# Patient Record
Sex: FEMALE | Race: WHITE | NOT HISPANIC OR LATINO | Employment: FULL TIME | URBAN - METROPOLITAN AREA
[De-identification: names, ages, dates, MRNs, and addresses within clinical notes are randomized per-mention and may not be internally consistent; named-entity substitution may affect disease eponyms.]

---

## 2019-07-24 ENCOUNTER — APPOINTMENT (OUTPATIENT)
Dept: RADIOLOGY | Facility: CLINIC | Age: 34
End: 2019-07-24
Payer: COMMERCIAL

## 2019-07-24 ENCOUNTER — OFFICE VISIT (OUTPATIENT)
Dept: URGENT CARE | Facility: CLINIC | Age: 34
End: 2019-07-24
Payer: COMMERCIAL

## 2019-07-24 ENCOUNTER — TELEPHONE (OUTPATIENT)
Dept: URGENT CARE | Facility: CLINIC | Age: 34
End: 2019-07-24

## 2019-07-24 VITALS
HEIGHT: 71 IN | BODY MASS INDEX: 41.02 KG/M2 | HEART RATE: 74 BPM | WEIGHT: 293 LBS | DIASTOLIC BLOOD PRESSURE: 78 MMHG | OXYGEN SATURATION: 97 % | SYSTOLIC BLOOD PRESSURE: 126 MMHG | TEMPERATURE: 97.6 F

## 2019-07-24 DIAGNOSIS — S92.101A CLOSED DISPLACED FRACTURE OF RIGHT TALUS, UNSPECIFIED FRACTURE MORPHOLOGY, INITIAL ENCOUNTER: ICD-10-CM

## 2019-07-24 DIAGNOSIS — S93.401A SPRAIN OF RIGHT ANKLE, UNSPECIFIED LIGAMENT, INITIAL ENCOUNTER: ICD-10-CM

## 2019-07-24 DIAGNOSIS — S93.401A SPRAIN OF RIGHT ANKLE, UNSPECIFIED LIGAMENT, INITIAL ENCOUNTER: Primary | ICD-10-CM

## 2019-07-24 PROCEDURE — 73610 X-RAY EXAM OF ANKLE: CPT

## 2019-07-24 PROCEDURE — 99213 OFFICE O/P EST LOW 20 MIN: CPT | Performed by: FAMILY MEDICINE

## 2019-07-24 RX ORDER — NAPROXEN 500 MG/1
500 TABLET ORAL 2 TIMES DAILY WITH MEALS
Qty: 20 TABLET | Refills: 0 | Status: SHIPPED | OUTPATIENT
Start: 2019-07-24 | End: 2020-07-07

## 2019-07-24 NOTE — PROGRESS NOTES
[id number] [email or web address]        NAME: Solitario Gan is a 36 y o  female  : 1985    MRN: [id number]  DATE: 2019  TIME: 10:27 AM    Assessment and Plan   Sprain of right ankle, unspecified ligament, initial encounter [S93 401A]  1  Sprain of right ankle, unspecified ligament, initial encounter  XR ankle 3+ vw right   2  Closed displaced fracture of right talus, unspecified fracture morphology, initial encounter  Ambulatory referral to Orthopedic Surgery     X-ray appears to show a closed displaced fracture of talar neck with mild tib-fib disassociation; official read pending  Patient placed in a splint, given crutches and referred to Orthopedic surgery for further evaluation and management  Patient Instructions     Follow up with PCP in 3-5 days  Proceed to  ER if symptoms worsen  Chief Complaint     Chief Complaint   Patient presents with    Ankle Injury     R ankle pain, swelling  she twisted it yesterday morning  been elevating and icing  Tylenol is not helping  pt is limping  History of Present Illness       28-year-old female presents today due to right ankle pain secondary to mechanical fall which occurred yesterday morning  Was walking on her deck when she slipped on some leaves falling forward  Her right foot twisted inward as she fell  Was initially able to walk on it, but her pain progressively worsened  Reports pain in the ankle and the distal lower leg  Denies any head trauma, loss of consciousness or dizziness  Review of Systems   Review of Systems   Constitutional: Negative for chills and fever  Musculoskeletal: Positive for arthralgias, gait problem and joint swelling  Skin: Positive for color change  Neurological: Negative for dizziness  Current Medications     No current outpatient medications on file      Current Allergies     Allergies as of 2019    (No Known Allergies)            The following portions of the patient's history were reviewed and updated as appropriate: allergies, current medications, past family history, past medical history, past social history, past surgical history and problem list      Past Medical History:   Diagnosis Date    Hypertension     after her pregnancy  no longer on BP meds  (19)       Past Surgical History:   Procedure Laterality Date     SECTION      x 2    ECTOPIC PREGNANCY SURGERY         Family History   Problem Relation Age of Onset    No Known Problems Mother     No Known Problems Father          Medications have been verified  Objective   /78 Comment: R arm, manual, burgundy cuff  Pulse 74   Temp 97 6 °F (36 4 °C)   Ht 5' 11" (1 803 m)   Wt (!) 142 kg (312 lb)   SpO2 97%   Breastfeeding? No   BMI 43 52 kg/m²        Physical Exam     Physical Exam   Constitutional: She is oriented to person, place, and time  She appears well-developed and well-nourished  She appears distressed (Antalgic gait)  HENT:   Head: Normocephalic and atraumatic  Eyes: Conjunctivae are normal    Pulmonary/Chest: Effort normal    Musculoskeletal: She exhibits edema and tenderness  She exhibits no deformity  Mildly decreased active ROM of the right ankle  Swelling and faint ecchymosis of lateral malleolus  Point tenderness over the anterior portion of the distal fibula near the ankle  Neurological: She is alert and oriented to person, place, and time  No sensory deficit  Skin: Skin is warm  She is not diaphoretic  No erythema  Psychiatric: She has a normal mood and affect  Her behavior is normal  Judgment and thought content normal    Nursing note and vitals reviewed

## 2019-07-24 NOTE — LETTER
July 24, 2019     Patient: Mak Schmitt   YOB: 1985   Date of Visit: 7/24/2019       To Whom It May Concern:    Mak Schmitt was evaluated here on 07/24/2019  Please excuse her absence  If you have any questions or concerns, please don't hesitate to call           Sincerely,        Abel Posey MD    CC: No Recipients

## 2020-07-07 ENCOUNTER — OFFICE VISIT (OUTPATIENT)
Dept: OBGYN CLINIC | Facility: CLINIC | Age: 35
End: 2020-07-07
Payer: COMMERCIAL

## 2020-07-07 VITALS
WEIGHT: 259.8 LBS | SYSTOLIC BLOOD PRESSURE: 118 MMHG | HEIGHT: 70 IN | DIASTOLIC BLOOD PRESSURE: 80 MMHG | BODY MASS INDEX: 37.19 KG/M2

## 2020-07-07 DIAGNOSIS — R35.0 URINARY FREQUENCY: ICD-10-CM

## 2020-07-07 DIAGNOSIS — Z11.3 SCREENING EXAMINATION FOR STD (SEXUALLY TRANSMITTED DISEASE): Primary | ICD-10-CM

## 2020-07-07 PROCEDURE — S0610 ANNUAL GYNECOLOGICAL EXAMINA: HCPCS | Performed by: STUDENT IN AN ORGANIZED HEALTH CARE EDUCATION/TRAINING PROGRAM

## 2020-07-07 RX ORDER — ALPRAZOLAM 0.25 MG/1
0.25 TABLET ORAL 3 TIMES DAILY PRN
COMMUNITY
Start: 2020-04-15 | End: 2021-10-25

## 2020-07-07 RX ORDER — SULFAMETHOXAZOLE AND TRIMETHOPRIM 800; 160 MG/1; MG/1
TABLET ORAL
COMMUNITY
Start: 2020-07-02 | End: 2020-11-05

## 2020-07-07 NOTE — PROGRESS NOTES
Davonte Garnica  1985    Assessment and Plan:  Yearly exam without abnormality      -Pap up to date: due 3/2022  We reviewed [facility] guidelines for Pap testing today  -Mirena in place: due for replacement Feb 2023  -STI testing today    RTO one year for yearly exam or sooner as needed  CC:  Yearly exam    S:  30 y o  female here for yearly exam      Shola Player (1 ectopic, 1 set of twins)  LMP: unsure, currently spotting  Contraception: Mirena  Last Pap: 3/2017 NILM/HPV neg    Non-smoker, social drinker  Exercises regularly as she is s/p gastric sleeve surgery  Has lost 70#    Doing well overall  Denies hot flushes, dyspareunia, abnormal uterine bleeding, urinary/fecal incontinence, does have some decreases in energy levels, poor mood as a result of COVID pandemic    Her cycles are almost non-existent  Mostly just experiences spotting  Not heavy or crampy  Sexual activity: She is sexually active and has a small amount of discomfort since she was most recently diagnosed with a UTI  Has now taken a full course of antibiotics, without improvement, leading to her being on second course of antibiotics  Generally however, she denies pain, bleeding or dryness  STD testing:  She does want STD testing today       Family hx of breast/ovarian/colon cancer: denies      Current Outpatient Medications:     ALPRAZolam (XANAX) 0 25 mg tablet, Take 0 25 mg by mouth 3 (three) times a day as needed, Disp: , Rfl:     levonorgestrel (MIRENA) 20 MCG/24HR IUD, 1 each by Intrauterine route, Disp: , Rfl:     sulfamethoxazole-trimethoprim (BACTRIM DS) 800-160 mg per tablet, take 1 tablet by mouth twice a day for 7 days, Disp: , Rfl:   Social History     Socioeconomic History    Marital status: /Civil Union     Spouse name: Not on file    Number of children: Not on file    Years of education: Not on file    Highest education level: Not on file   Occupational History    Not on file   Social Needs    Financial resource strain: Not on file    Food insecurity:     Worry: Not on file     Inability: Not on file    Transportation needs:     Medical: Not on file     Non-medical: Not on file   Tobacco Use    Smoking status: Never Smoker    Smokeless tobacco: Never Used   Substance and Sexual Activity    Alcohol use: Yes     Comment: social    Drug use: Never    Sexual activity: Yes     Partners: Male     Birth control/protection: Implant   Lifestyle    Physical activity:     Days per week: Not on file     Minutes per session: Not on file    Stress: Not on file   Relationships    Social connections:     Talks on phone: Not on file     Gets together: Not on file     Attends Synagogue service: Not on file     Active member of club or organization: Not on file     Attends meetings of clubs or organizations: Not on file     Relationship status: Not on file    Intimate partner violence:     Fear of current or ex partner: Not on file     Emotionally abused: Not on file     Physically abused: Not on file     Forced sexual activity: Not on file   Other Topics Concern    Not on file   Social History Narrative    Not on file     Family History   Problem Relation Age of Onset    No Known Problems Mother     No Known Problems Father       Past Medical History:   Diagnosis Date    Acid reflux     Hypertension     after her pregnancy  no longer on BP meds  (7/24/19)    Obesity     Pre-diabetes         Review of Systems   Respiratory: Negative  Cardiovascular: Negative  Gastrointestinal: Negative for constipation and diarrhea  Genitourinary: Negative for difficulty urinating, pelvic pain, vaginal bleeding, vaginal discharge, itching or odor  O:  Blood pressure 118/80, height 5' 9 5" (1 765 m), weight 118 kg (259 lb 12 8 oz), not currently breastfeeding      Patient appears well and is not in distress  Neck is supple without masses  Breasts are symmetrical without mass, tenderness, nipple discharge, skin changes or adenopathy  Fibroadenomatous changes in upper outer quadrant bilaterally, no discrete masses  Abdomen is soft and nontender without masses  Incisions healing well, erythematous rash underneath pannus  External genitals are normal without lesions or rashes  Urethral meatus and urethra are normal  Bladder is normal to palpation  Vagina is normal without discharge or bleeding  Cervix is normal without discharge or lesion  IUD strings visible  Uterus is normal, mobile, nontender without palpable mass  Adnexa are normal, nontender, without palpable mass

## 2020-07-09 LAB
BACTERIA UR CULT: NORMAL
Lab: NO GROWTH

## 2020-07-10 ENCOUNTER — TELEPHONE (OUTPATIENT)
Dept: OBGYN CLINIC | Facility: CLINIC | Age: 35
End: 2020-07-10

## 2020-07-10 NOTE — TELEPHONE ENCOUNTER
----- Message from Iain Addison MD sent at 7/10/2020  8:49 AM EDT -----  Hi, could you please call Fredi Guillen to let her know that her urine culture result was normal, thank you!

## 2020-07-11 LAB
C TRACH RRNA SPEC QL NAA+PROBE: NEGATIVE
N GONORRHOEA RRNA SPEC QL NAA+PROBE: NEGATIVE

## 2020-10-21 ENCOUNTER — TELEPHONE (OUTPATIENT)
Dept: PSYCHIATRY | Facility: CLINIC | Age: 35
End: 2020-10-21

## 2020-10-22 ENCOUNTER — TELEPHONE (OUTPATIENT)
Dept: PSYCHIATRY | Facility: CLINIC | Age: 35
End: 2020-10-22

## 2020-11-05 ENCOUNTER — OFFICE VISIT (OUTPATIENT)
Dept: OBGYN CLINIC | Facility: CLINIC | Age: 35
End: 2020-11-05
Payer: COMMERCIAL

## 2020-11-05 VITALS — DIASTOLIC BLOOD PRESSURE: 72 MMHG | SYSTOLIC BLOOD PRESSURE: 132 MMHG | WEIGHT: 245.2 LBS | BODY MASS INDEX: 35.69 KG/M2

## 2020-11-05 DIAGNOSIS — Z01.419 ENCOUNTER FOR ANNUAL ROUTINE GYNECOLOGICAL EXAMINATION: ICD-10-CM

## 2020-11-05 DIAGNOSIS — Z11.3 SCREENING FOR STDS (SEXUALLY TRANSMITTED DISEASES): ICD-10-CM

## 2020-11-05 DIAGNOSIS — B37.3 YEAST INFECTION OF THE VAGINA: ICD-10-CM

## 2020-11-05 DIAGNOSIS — B37.2 SKIN YEAST INFECTION: ICD-10-CM

## 2020-11-05 DIAGNOSIS — N93.0 PCB (POST COITAL BLEEDING): ICD-10-CM

## 2020-11-05 DIAGNOSIS — R10.2 PELVIC PAIN: Primary | ICD-10-CM

## 2020-11-05 PROBLEM — B37.31 YEAST INFECTION OF THE VAGINA: Status: ACTIVE | Noted: 2020-11-05

## 2020-11-05 PROCEDURE — 87624 HPV HI-RISK TYP POOLED RSLT: CPT | Performed by: NURSE PRACTITIONER

## 2020-11-05 PROCEDURE — G0145 SCR C/V CYTO,THINLAYER,RESCR: HCPCS | Performed by: NURSE PRACTITIONER

## 2020-11-05 PROCEDURE — 87491 CHLMYD TRACH DNA AMP PROBE: CPT | Performed by: NURSE PRACTITIONER

## 2020-11-05 PROCEDURE — 87591 N.GONORRHOEAE DNA AMP PROB: CPT | Performed by: NURSE PRACTITIONER

## 2020-11-05 PROCEDURE — 99214 OFFICE O/P EST MOD 30 MIN: CPT | Performed by: NURSE PRACTITIONER

## 2020-11-05 RX ORDER — NYSTATIN 100000 [USP'U]/G
POWDER TOPICAL 2 TIMES DAILY
Qty: 15 G | Refills: 4 | Status: SHIPPED | OUTPATIENT
Start: 2020-11-05 | End: 2022-05-02

## 2020-11-05 RX ORDER — FLUCONAZOLE 150 MG/1
TABLET ORAL
Qty: 2 TABLET | Refills: 0 | Status: SHIPPED | OUTPATIENT
Start: 2020-11-05 | End: 2020-11-08

## 2020-11-06 LAB
C TRACH DNA SPEC QL NAA+PROBE: NEGATIVE
HPV HR 12 DNA CVX QL NAA+PROBE: NEGATIVE
HPV16 DNA CVX QL NAA+PROBE: NEGATIVE
HPV18 DNA CVX QL NAA+PROBE: NEGATIVE
N GONORRHOEA DNA SPEC QL NAA+PROBE: NEGATIVE

## 2020-11-10 PROBLEM — N93.0 PCB (POST COITAL BLEEDING): Status: ACTIVE | Noted: 2020-11-10

## 2020-11-10 PROBLEM — R10.2 PELVIC PAIN: Status: ACTIVE | Noted: 2020-11-10

## 2020-11-10 LAB
LAB AP GYN PRIMARY INTERPRETATION: NORMAL
Lab: NORMAL

## 2020-11-30 ENCOUNTER — OFFICE VISIT (OUTPATIENT)
Dept: PSYCHIATRY | Facility: CLINIC | Age: 35
End: 2020-11-30
Payer: COMMERCIAL

## 2020-11-30 DIAGNOSIS — F90.0 ATTENTION DEFICIT HYPERACTIVITY DISORDER, INATTENTIVE TYPE: Primary | ICD-10-CM

## 2020-11-30 PROBLEM — F90.9 ADHD: Status: ACTIVE | Noted: 2020-11-30

## 2020-11-30 PROCEDURE — 90792 PSYCH DIAG EVAL W/MED SRVCS: CPT | Performed by: NURSE PRACTITIONER

## 2020-12-29 ENCOUNTER — TELEMEDICINE (OUTPATIENT)
Dept: PSYCHIATRY | Facility: CLINIC | Age: 35
End: 2020-12-29
Payer: COMMERCIAL

## 2020-12-29 DIAGNOSIS — F90.0 ATTENTION DEFICIT HYPERACTIVITY DISORDER, INATTENTIVE TYPE: ICD-10-CM

## 2020-12-29 PROCEDURE — 99214 OFFICE O/P EST MOD 30 MIN: CPT | Performed by: NURSE PRACTITIONER

## 2021-02-01 DIAGNOSIS — F90.0 ATTENTION DEFICIT HYPERACTIVITY DISORDER, INATTENTIVE TYPE: ICD-10-CM

## 2021-02-02 DIAGNOSIS — F90.0 ATTENTION DEFICIT HYPERACTIVITY DISORDER, INATTENTIVE TYPE: ICD-10-CM

## 2021-02-10 ENCOUNTER — OFFICE VISIT (OUTPATIENT)
Dept: DERMATOLOGY | Age: 36
End: 2021-02-10
Payer: COMMERCIAL

## 2021-02-10 VITALS — WEIGHT: 241 LBS | HEIGHT: 71 IN | BODY MASS INDEX: 33.74 KG/M2 | TEMPERATURE: 97.3 F

## 2021-02-10 DIAGNOSIS — L21.9 SEBORRHEIC DERMATITIS: ICD-10-CM

## 2021-02-10 DIAGNOSIS — L30.4 INTERTRIGO: ICD-10-CM

## 2021-02-10 DIAGNOSIS — L72.11 PILAR CYST: Primary | ICD-10-CM

## 2021-02-10 DIAGNOSIS — D48.5 NEOPLASM OF UNCERTAIN BEHAVIOR OF SKIN: ICD-10-CM

## 2021-02-10 PROCEDURE — 99204 OFFICE O/P NEW MOD 45 MIN: CPT | Performed by: DERMATOLOGY

## 2021-02-10 RX ORDER — KETOCONAZOLE 20 MG/G
CREAM TOPICAL
Qty: 60 G | Refills: 3 | Status: SHIPPED | OUTPATIENT
Start: 2021-02-10 | End: 2021-10-25

## 2021-02-10 NOTE — PATIENT INSTRUCTIONS
Based on a thorough discussion of this condition and the management approach to it (including a comprehensive discussion of the known risks, side effects and potential benefits of treatment), the patient (family) agrees to implement the following specific plan:   No treatment at this time     PILAR CYST  A trichilemmal cyst, also known as a pilar cyst, is a keratin-filled cyst that originates from the outer hair root sheath  Keratin is the protein that makes up hair and nails  Trichilemmal cysts are most commonly found on the scalp and are usually diagnosed in middle-aged females  They often run in the family, as they have an autosomal dominant pattern of inheritance (ie, the tendency to the cysts can be is passed on by a parent to their child of either sex, and the child has a 1 in 2 likelihood of inheriting it)  What are the clinical features of trichilemmal cyst?  Trichilemmal cysts may look similar to epidermoid cyst and are often incorrectly termed sebaceous cysts  Trichilemmal cysts present as one or more firm, mobile, subcutaneous nodules measuring of various size  There is no central punctum, unlike an epidermoid cyst  A trichilemmal cyst can be painful if inflamed  What is the treatment for trichilemmal cysts?  Pilar cysts can slowly enlarge, rupture with marked  inflammation, and can be associated with hairloss    It was discused that removal ideally includes escision of sac and contents often with sutures    Based on a thorough discussion of this condition and the management approach to it (including a comprehensive discussion of the known risks, side effects and potential benefits of treatment), the patient (family) agrees to implement the following specific plan:   Start first with the hydrocortisone 2 5 % twice a day for 2 weeks    After the 2 weeks start the ketoconazole cream apply topically twice a day       Seborrheic Dermatitis   Seborrheic dermatitis is a common, chronic or relapsing form of eczema/dermatitis that mainly affects the sebaceous, gland-rich regions of the scalp, face, and trunk  There are infantile and adult forms of seborrhoeic dermatitis  It is sometimes associated with psoriasis and, in that clinical scenario, may be referred to as "sebo-psoriasis "  Seborrheic dermatitis is also known as "seborrheic eczema "  Dandruff (also called "pityriasis capitis") is an uninflamed form of seborrhoeic dermatitis  Dandruff presents as bran-like scaly patches scattered within hair-bearing areas of the scalp  In an infant, this condition may be referred to as "cradle cap "  The cause of seborrheic dermatitis is not completely understood  It is associated with proliferation of various species of the skin commensal Malassezia, in its yeast (non-pathogenic) form  Its metabolites (such as the fatty acids oleic acid, malssezin, and indole-3-carbaldehyde) may cause an inflammatory reaction  Differences in skin barrier lipid content and function may account for individual presentations  Infantile Seborrheic Dermatitis  Infantile seborrheic dermatitis affects babies under the age of 1 months and usually resolves by 1012 months of age  Infantile seborrheic dermatitis causes "cradle cap" (diffuse, greasy scaling on scalp)  The rash may spread to affect armpit and groin folds (a type of "napkin dermatitis")  There may be associated salmon-pink colored patches that may flake or peel  The rash in this case is usually not especially itchy, so the baby often appears undisturbed by the rash, even when more generalized  Adult Seborrheic Dermatitis  Adult seborrheic dermatitis tends to begin in late adolescence; prevalence is greatest in young adults and in the elderly  It is more common in males than in females      The following factors are sometimes associated with severe adult seborrheic dermatitis:   Oily skin   Familial tendency to seborrhoeic dermatitis or a family history of psoriasis   Immunosuppression: organ transplant recipient, human immunodeficiency virus (HIV) infection and patients with lymphoma   Neurological and psychiatric diseases: Parkinson disease, tardive dyskinesia, depression, epilepsy, facial nerve palsy, spinal cord injury and congenital disorders such as Down syndrome   Treatment for psoriasis with psoralen and ultraviolet A (PUVA) therapy   Lack of sleep   Stressful events  In adults, seborrheic dermatitis may typically affect the scalp, face (creases around the nose, behind ears, within eyebrows) and upper trunk  Typical clinical features include:   Winter flares, improving in summer following sun exposure   Minimal itch most of the time   Combination oily and dry mid-facial skin   Ill-defined localized scaly patches or diffuse scale in the scalp   Blepharitis; scaly red eyelid margins   [place]-pink, thin, scaly, and ill-defined plaques in skin folds on both sides of the face   Petal or ring-shaped flaky patches on hair-line and on anterior chest   Rash in armpits, under the breasts, in the groin folds and genital creases   Superficial folliculitis (inflamed hair follicles) on cheeks and upper trunk    Seborrheic dermatitis is diagnosed by its clinical appearance and behavior  Skin biopsy may be helpful but is rarely necessary to make this diagnosis      Based on a thorough discussion of this condition and the management approach to it (including a comprehensive discussion of the known risks, side effects and potential benefits of treatment), the patient (family) agrees to implement the following specific plan:   Start hydrocortisone 2 5 % and  ketoconazole cream together apply topically twice a day for 3 weeks to abdomen  [employer] using a bland (no fragrance) Antiperspirant  Twice daily to the area      Assessment and Plan  Intertrigo describes a rash in the flexures or body folds, such as behind the ears, in the folds of the neck, under the arms (axillae), under a protruding abdomen, in the groin, between the buttocks, in the finger webs or toe spaces  Although intertrigo may affect one skin fold, it is common for it to involve multiple sites  Intertrigo can affect males and females of any age  It is particularly common in people that are overweight or obese (see metabolic syndrome)  Other contributing factors are:   Genetic tendency to skin disease   Hyperhidrosis (excessive sweating)    Intertrigo can be acute (recent onset), relapsing (recurrent), or chronic (present for more than 6 weeks)  The exact appearance and behaviour depends on the underlying cause or causes  The skin affected by intertrigo is inflamed, ie reddened and uncomfortable  It may become moist and macerated, leading to fissuring (cracks) and peeling  Intertrigo is due to genetic and environmental factors   Flexural skin has relatively high surface temperature   Moisture from insensible water loss and sweating cannot evaporate due to occlusion   Friction from movement of adjacent skin results in chafing  The microorganisms that are normally resident on flexural skin, the microbiome, include corynebacteria, other bacteria and yeasts  These multiply in warm moist environments and may cause disease  We can classify intertrigo into infectious and inflammatory origin but there is often overlap   Infections tend to be unilateral and asymmetrical    Inflammatory disorders tend to be symmetrical affecting armpits, groins, under the breasts and the abdominal folds, except atopic dermatitis, which more often arises on the neck, and in elbow and knee creases  Investigations may be necessary to determine the cause of intertrigo     A swab for microscopy and culture of bacteria (microbiology)   A scraping for microscopy and culture of fungi (mycology)   A skin biopsy may be performed for histopathology if the skin condition is unusual or fails to respond to treatment  What is the treatment for intertrigo? Treatment depends on the underlying cause, if identified, and on which micro-organisms are present in the rash  Combinations are common   Sweating may be reduced with a gentle antiperspirant   Physical exertion should be followed by a bath and completely drying the skin folds using a hair dryer on cool setting, soft towel and/or corn starch powder   Triple paste contains petrolatum, zinc oxide, and aluminum acetate solution to reduce friction, irritation and sweating   Bacteria may be treated with topical antibiotics such as fusidic acid cream, mupirocin ointment, or oral antibiotics such as flucloxacillin and erythromycin   Yeasts and fungi may be treated with topical antifungals such as clotrimazole and terbinafine cream or oral antifungal agents such as itraconazole or terbinafine   Inflammatory skin diseases are often treated with low potency topical steroid creams such as hydrocortisone  More potent steroids are usually avoided in the flexures because they may cause skin thinning resulting in stretch marks (striae) and even ulcers  Calcineurin inhibitors such as tacrolimus ointment or pimecrolimus cream may also prove effective

## 2021-02-10 NOTE — PROGRESS NOTES
[address] [facility] Note     Patient Name: Izabela Hanna  Encounter Date: 02/10/2021      Have you been cared for by a [facility] Dermatologist in the last 3 years and, if so, which one? No    · Have you traveled outside of the [address] [place] [address] in the past 3 months or outside of the [facility] area in the last 2 weeks? No     May we call your Preferred Phone number to discuss your specific medical information? Yes     May we leave a detailed message that includes your specific medical information? Yes       Today's Chief Concerns:   Concern #1:  Skin check   Concern #2:  Nodule on scalp    Past Medical History:  Have you personally ever had or currently have any of the following? · Skin cancer (such as Melanoma, Basal Cell Carcinoma, Squamous Cell Carcinoma? (If Yes, please provide more detail)- No  · Eczema: No  · Psoriasis: No  · HIV/AIDS: No  · Hepatitis B or C: No  · Tuberculosis: No  · Systemic Immunosuppression such as Diabetes, Biologic or Immunotherapy, Chemotherapy, Organ Transplantation, Bone Marrow Transplantation (If YES, please provide more detail): No  · Radiation Treatment (If YES, please provide more detail): No  · Any other major medical conditions/concerns? (If Yes, which types)- no    Social History:     What is/was your primary occupation?  What are your hobbies/past-times? Family History:  Have any of your "first degree relatives" (parent, brother, sister, or child) had any of the following       · Skin cancer such as Melanoma or Merkel Cell Carcinoma or Pancreatic Cancer? YES, pre cancer: sister and father  · Eczema, Asthma, Hay Fever or Seasonal Allergies: YES, childern  · Psoriasis or Psoriatic Arthritis: No  · Do any other medical conditions seem to run in your family? If Yes, what condition and which relatives?   No    Current Medications:   (please update all dermatological medications before printing patient's AVS!)      Current Outpatient Medications:     ALPRAZolam (XANAX) 0 25 mg tablet, Take 0 25 mg by mouth 3 (three) times a day as needed, Disp: , Rfl:     levonorgestrel (MIRENA) 20 MCG/24HR IUD, 1 each by Intrauterine route, Disp: , Rfl:     lisdexamfetamine (VYVANSE) 20 MG capsule, Take 1 capsule (20 mg total) by mouth every morningMax Daily Amount: 20 mg, Disp: 30 capsule, Rfl: 0    nystatin (MYCOSTATIN) powder, Apply topically 2 (two) times a day, Disp: 15 g, Rfl: 4      Review of Systems:  Have you recently had or currently have any of the following? If YES, what are you doing for the problem? · Fever, chills or unintended weight loss: No  · Sudden loss or change in your vision: No  · Nausea, vomiting or blood in your stool: No  · Painful or swollen joints: No  · Wheezing or cough: No  · Changing mole or non-healing wound: No  · Nosebleeds: No  · Excessive sweating: No  · Easy or prolonged bleeding? No  · Over the last 2 weeks, how often have you been bothered by the following problems? · Taking little interest or pleasure in doing things: 1 - Not at All  · Feeling down, depressed, or hopeless: 1 - Not at All  · Rapid heartbeat with epinephrine:  No    · FEMALES ONLY:    · Are you pregnant or planning to become pregnant? No  · Are you currently or planning to be nursing or breast feeding? No    · Any known allergies? No Known Allergies      Physical Exam:     Was a chaperone (Derm Clinical Assistant) present throughout the entire Physical Exam? Yes     Did the Dermatology Team specifically  the patient on the importance of a Full Skin Exam to be sure that nothing is missed clinically?  Yes}  o Did the patient ultimately request or accept a Full Skin Exam?  Yes  o Did the patient specifically refuse to have the areas "under-the-bra" examined by the Dermatologist? No  o Did the patient specifically refuse to have the areas "under-the-underwear" examined by the Dermatologist? No    CONSTITUTIONAL:   Vitals:    02/10/21 1531   Temp: (!) 97 3 °F (36 3 °C)   Weight: 109 kg (241 lb)   Height: 5' 11" (1 803 m)           PSYCH: Normal mood and affect  EYES: Normal conjunctiva  ENT: Normal lips and oral mucosa  CARDIOVASCULAR: No edema  RESPIRATORY: Normal respirations  HEME/LYMPH/IMMUNO:  No regional lymphadenopathy except as noted below in "ASSESSMENT AND PLAN BY DIAGNOSIS"    SKIN:  FULL ORGAN SYSTEM EXAM   Hair, Scalp, Ears, Face Normal except as noted below in Assessment   Neck, Cervical Chain Nodes Normal except as noted below in Assessment   Right Arm/Hand/Fingers Normal except as noted below in Assessment   Left Arm/Hand/Fingers Normal except as noted below in Assessment   Chest/Breasts/Axillae Viewed areas Normal except as noted below in Assessment   Abdomen, Umbilicus Normal except as noted below in Assessment   Back/Spine Normal except as noted below in Assessment   Groin/Genitalia/Buttocks NOT EXAMINED   Right Leg, Foot, Toes Normal except as noted below in Assessment   Left Leg, Foot, Toes Normal except as noted below in Assessment        Assessment and Plan by Diagnosis:    History of Present Condition:     Duration:  How long has this been an issue for you?    o  years   Location Affected:  Where on the body is this affecting you? o  scalp   Quality:  Is there any bleeding, pain, itch, burning/irritation, or redness associated with the skin lesion? o  denies   Severity:  Describe any bleeding, pain, itch, burning/irritation, or redness on a scale of 1 to 10 (with 10 being the worst)    o  n/a   Timing:  Does this condition seem to be there pretty constantly or do you notice it more at specific times throughout the day?    o  denies   Context:  Have you ever noticed that this condition seems to be associated with specific activities you do?    o  increase in size    Modifying Factors:    o Anything that seems to make the condition worse?    -  denies  o What have you tried to do to make the condition better?    -  denies   Associated Signs and Symptoms:  Does this skin lesion seem to be associated with any of the following:  o  denies       1  PILAR CYST    Physical Exam:   Anatomic Location Affected:  Left frontal scalp   Morphological Description:  X 2 1 cm subcutaneous nodule    Pertinent Positives:   Pertinent Negatives: Additional History of Present Condition:  Patient states she noticed a few bumps on the scalp but denies any pain or discomfort  Patient states her sister also has the bump son scalp  Assessment and Plan:  Based on a thorough discussion of this condition and the management approach to it (including a comprehensive discussion of the known risks, side effects and potential benefits of treatment), the patient (family) agrees to implement the following specific plan:   No treatment at this time     PILAR CYST  A trichilemmal cyst, also known as a pilar cyst, is a keratin-filled cyst that originates from the outer hair root sheath  Keratin is the protein that makes up hair and nails  Trichilemmal cysts are most commonly found on the scalp and are usually diagnosed in middle-aged females  They often run in the family, as they have an autosomal dominant pattern of inheritance (ie, the tendency to the cysts can be is passed on by a parent to their child of either sex, and the child has a 1 in 2 likelihood of inheriting it)  What are the clinical features of trichilemmal cyst?  Trichilemmal cysts may look similar to epidermoid cyst and are often incorrectly termed sebaceous cysts  Trichilemmal cysts present as one or more firm, mobile, subcutaneous nodules measuring of various size  There is no central punctum, unlike an epidermoid cyst  A trichilemmal cyst can be painful if inflamed  What is the treatment for trichilemmal cysts?  Pilar cysts can slowly enlarge, rupture with marked  inflammation, and can be associated with hairloss    It was discused that removal ideally includes escision of sac and contents often with sutures    2  SEBORRHEIC DERMATITIS    Physical Exam:   Anatomic Location Affected:  forehead   Morphological Description:  Red plaques with fine greasy scaly    Pertinent Positives:   Pertinent Negatives: Additional History of Present Condition:  Patient states she noticed starting after pregnancy and has never gone away    Assessment and Plan:  Based on a thorough discussion of this condition and the management approach to it (including a comprehensive discussion of the known risks, side effects and potential benefits of treatment), the patient (family) agrees to implement the following specific plan:   Start first with the hydrocortisone 2 5 % twice a day for 2 weeks    After the 2 weeks start the ketoconazole cream apply topically twice a day       Seborrheic Dermatitis   Seborrheic dermatitis is a common, chronic or relapsing form of eczema/dermatitis that mainly affects the sebaceous, gland-rich regions of the scalp, face, and trunk  There are infantile and adult forms of seborrhoeic dermatitis  It is sometimes associated with psoriasis and, in that clinical scenario, may be referred to as "sebo-psoriasis "  Seborrheic dermatitis is also known as "seborrheic eczema "  Dandruff (also called "pityriasis capitis") is an uninflamed form of seborrhoeic dermatitis  Dandruff presents as bran-like scaly patches scattered within hair-bearing areas of the scalp  In an infant, this condition may be referred to as "cradle cap "  The cause of seborrheic dermatitis is not completely understood  It is associated with proliferation of various species of the skin commensal Malassezia, in its yeast (non-pathogenic) form  Its metabolites (such as the fatty acids oleic acid, malssezin, and indole-3-carbaldehyde) may cause an inflammatory reaction   Differences in skin barrier lipid content and function may account for individual presentations  Infantile Seborrheic Dermatitis  Infantile seborrheic dermatitis affects babies under the age of 1 months and usually resolves by 1012 months of age  Infantile seborrheic dermatitis causes "cradle cap" (diffuse, greasy scaling on scalp)  The rash may spread to affect armpit and groin folds (a type of "napkin dermatitis")  There may be associated salmon-pink colored patches that may flake or peel  The rash in this case is usually not especially itchy, so the baby often appears undisturbed by the rash, even when more generalized  Adult Seborrheic Dermatitis  Adult seborrheic dermatitis tends to begin in late adolescence; prevalence is greatest in young adults and in the elderly  It is more common in males than in females  The following factors are sometimes associated with severe adult seborrheic dermatitis:   Oily skin   Familial tendency to seborrhoeic dermatitis or a family history of psoriasis   Immunosuppression: organ transplant recipient, human immunodeficiency virus (HIV) infection and patients with lymphoma   Neurological and psychiatric diseases: Parkinson disease, tardive dyskinesia, depression, epilepsy, facial nerve palsy, spinal cord injury and congenital disorders such as Down syndrome   Treatment for psoriasis with psoralen and ultraviolet A (PUVA) therapy   Lack of sleep   Stressful events  In adults, seborrheic dermatitis may typically affect the scalp, face (creases around the nose, behind ears, within eyebrows) and upper trunk   Typical clinical features include:   Winter flares, improving in summer following sun exposure   Minimal itch most of the time   Combination oily and dry mid-facial skin   Ill-defined localized scaly patches or diffuse scale in the scalp   Blepharitis; scaly red eyelid margins   [place]-pink, thin, scaly, and ill-defined plaques in skin folds on both sides of the face   Petal or ring-shaped flaky patches on hair-line and on anterior chest   Rash in armpits, under the breasts, in the groin folds and genital creases   Superficial folliculitis (inflamed hair follicles) on cheeks and upper trunk    Seborrheic dermatitis is diagnosed by its clinical appearance and behavior  Skin biopsy may be helpful but is rarely necessary to make this diagnosis  3  NEOPLASM OF UNCERTAIN BEHAVIOR OF SKIN    Physical Exam:   (Anatomic Location); (Size and Morphological Description); (Differential Diagnosis):  o Right lower back,skin; shave biopsy;  6 mm soft brown papules; get caught on clothes and become painful, Diff dx: intradermal nevus versus neurofibroma    Pertinent Positives:   Pertinent Negatives: Additional History of Present Condition:  Patient states it caught on clothes and becomes irritated  Her father and sister both had pre cancerous skin cancer  Assessment and Plan:  o Will have biopsy at next visit   4  INTERTRIGO    Physical Exam:   Anatomic Location Affected:  Lower abdomen    Morphological Description:  Pink patch    Pertinent Positives:   Pertinent Negatives: Additional History of Present Condition:  Patient stats she has a continuous rash near her c- section that she been treating with nystatin powder  Assessment and Plan:  Based on a thorough discussion of this condition and the management approach to it (including a comprehensive discussion of the known risks, side effects and potential benefits of treatment), the patient (family) agrees to implement the following specific plan:   Start hydrocortisone 2 5 % and  ketoconazole cream together apply topically twice a day for 3 weeks   Start bland (no fragrance) Antiperspirant  Twice daily to the area      Assessment and Plan  Intertrigo describes a rash in the flexures or body folds, such as behind the ears, in the folds of the neck, under the arms (axillae), under a protruding abdomen, in the groin, between the buttocks, in the finger webs or toe spaces    Although intertrigo may affect one skin fold, it is common for it to involve multiple sites  Intertrigo can affect males and females of any age  It is particularly common in people that are overweight or obese (see metabolic syndrome)  Other contributing factors are:   Genetic tendency to skin disease   Hyperhidrosis (excessive sweating)    Intertrigo can be acute (recent onset), relapsing (recurrent), or chronic (present for more than 6 weeks)  The exact appearance and behaviour depends on the underlying cause or causes  The skin affected by intertrigo is inflamed, ie reddened and uncomfortable  It may become moist and macerated, leading to fissuring (cracks) and peeling  Intertrigo is due to genetic and environmental factors   Flexural skin has relatively high surface temperature   Moisture from insensible water loss and sweating cannot evaporate due to occlusion   Friction from movement of adjacent skin results in chafing  The microorganisms that are normally resident on flexural skin, the microbiome, include corynebacteria, other bacteria and yeasts  These multiply in warm moist environments and may cause disease  We can classify intertrigo into infectious and inflammatory origin but there is often overlap   Infections tend to be unilateral and asymmetrical    Inflammatory disorders tend to be symmetrical affecting armpits, groins, under the breasts and the abdominal folds, except atopic dermatitis, which more often arises on the neck, and in elbow and knee creases  Investigations may be necessary to determine the cause of intertrigo   A swab for microscopy and culture of bacteria (microbiology)   A scraping for microscopy and culture of fungi (mycology)   A skin biopsy may be performed for histopathology if the skin condition is unusual or fails to respond to treatment  What is the treatment for intertrigo?   Treatment depends on the underlying cause, if identified, and on which micro-organisms are present in the rash  Combinations are common   Sweating may be reduced with a gentle antiperspirant   Physical exertion should be followed by a bath and completely drying the skin folds using a hair dryer on cool setting, soft towel and/or corn starch powder   Triple paste contains petrolatum, zinc oxide, and aluminum acetate solution to reduce friction, irritation and sweating   Bacteria may be treated with topical antibiotics such as fusidic acid cream, mupirocin ointment, or oral antibiotics such as flucloxacillin and erythromycin   Yeasts and fungi may be treated with topical antifungals such as clotrimazole and terbinafine cream or oral antifungal agents such as itraconazole or terbinafine   Inflammatory skin diseases are often treated with low potency topical steroid creams such as hydrocortisone  More potent steroids are usually avoided in the flexures because they may cause skin thinning resulting in stretch marks (striae) and even ulcers  Calcineurin inhibitors such as tacrolimus ointment or pimecrolimus cream may also prove effective    Scribe Attestation    I,:  EZMovevinay Truong am acting as a scribe while in the presence of the attending physician :       I,:  Sunshine Carrion MD personally performed the services described in this documentation    as scribed in my presence :

## 2021-03-03 DIAGNOSIS — F90.0 ATTENTION DEFICIT HYPERACTIVITY DISORDER, INATTENTIVE TYPE: ICD-10-CM

## 2021-04-02 ENCOUNTER — TELEMEDICINE (OUTPATIENT)
Dept: PSYCHIATRY | Facility: CLINIC | Age: 36
End: 2021-04-02

## 2021-04-02 DIAGNOSIS — F41.1 GENERALIZED ANXIETY DISORDER: ICD-10-CM

## 2021-04-02 DIAGNOSIS — F90.2 ATTENTION DEFICIT HYPERACTIVITY DISORDER (ADHD), COMBINED TYPE: Primary | ICD-10-CM

## 2021-04-02 PROBLEM — R73.9 HYPERGLYCEMIA: Status: ACTIVE | Noted: 2018-12-01

## 2021-04-02 PROCEDURE — NOSHOW: Performed by: NURSE PRACTITIONER

## 2021-04-02 NOTE — PSYCH
No Call  No Show  Charge Antelmo yeung no showed 04/02/21 appointment , staff called and left message to reschedule appointment     Treatment Plan not due at this session

## 2021-04-15 ENCOUNTER — TELEMEDICINE (OUTPATIENT)
Dept: PSYCHIATRY | Facility: CLINIC | Age: 36
End: 2021-04-15
Payer: COMMERCIAL

## 2021-04-15 DIAGNOSIS — F90.2 ATTENTION DEFICIT HYPERACTIVITY DISORDER (ADHD), COMBINED TYPE: ICD-10-CM

## 2021-04-15 DIAGNOSIS — F41.9 ANXIETY AND DEPRESSION: ICD-10-CM

## 2021-04-15 DIAGNOSIS — F32.A ANXIETY AND DEPRESSION: ICD-10-CM

## 2021-04-15 DIAGNOSIS — F41.1 GENERALIZED ANXIETY DISORDER: ICD-10-CM

## 2021-04-15 DIAGNOSIS — F41.9 ANXIETY: Primary | ICD-10-CM

## 2021-04-15 PROCEDURE — 90833 PSYTX W PT W E/M 30 MIN: CPT | Performed by: NURSE PRACTITIONER

## 2021-04-15 PROCEDURE — 99214 OFFICE O/P EST MOD 30 MIN: CPT | Performed by: NURSE PRACTITIONER

## 2021-04-15 RX ORDER — ESCITALOPRAM OXALATE 10 MG/1
10 TABLET ORAL DAILY
Qty: 30 TABLET | Refills: 3 | Status: SHIPPED | OUTPATIENT
Start: 2021-04-15 | End: 2021-10-25

## 2021-04-16 NOTE — PSYCH
Virtual Regular Visit    Problem List Items Addressed This Visit        Other    ADHD    Relevant Medications    escitalopram (LEXAPRO) 10 mg tablet    Anxiety and depression    Relevant Medications    escitalopram (LEXAPRO) 10 mg tablet    Generalized anxiety disorder    Relevant Medications    escitalopram (LEXAPRO) 10 mg tablet      Other Visit Diagnoses     Anxiety    -  Primary    Relevant Medications    escitalopram (LEXAPRO) 10 mg tablet        Reason for visit is   Chief Complaint   Patient presents with    Follow-up    Medication Management     Encounter provider Sheeba Boyer    Provider located at [address] [place], [place] [address]  [place] [address]  [phone]    Recent Visits  No visits were found meeting these conditions  Showing recent visits within past 7 days and meeting all other requirements     Today's Visits  Date Type Provider Dept   04/15/21 Telemedicine Sheeba Boyer [facility]   Showing today's visits and meeting all other requirements     Future Appointments  No visits were found meeting these conditions  Showing future appointments within next 150 days and meeting all other requirements        After connecting through [place], the patient was identified by name and date of birth  Cristel Nelsonestefany was informed that this is a telemedicine visit and that the visit is being conducted through [email or web address] and patient was informed that this is a secure, HIPAA-compliant platform  She agrees to proceed  which may not be secure and therefore, might not be HIPAA-compliant  My office door was closed  No one else was in the room  She acknowledged consent and understanding of privacy and security of the video platform  The patient has agreed to participate and understands they can discontinue the visit at any time      SUBJECTIVE:    Cristel Sorto is a 29 y o  female with a history of attention deficit disorder, anxiety and depression seen for follow up and medication management  Earl Jackson continues her divorce proceedings, which has become a main  source of concerns  Her present  has not been paying child support and he did not sign an agreement to sell the marital home until this morning  As a result Earl Jackson described more periods of irritability, anxiety  Her attention and concentration have both improved  And "(I) cannot focus without my Vyvanse " we spent some time discussing issues of stress and stress reduction  Earl Jackson has a good background in behavioral health and has started to apply some of those principles to her own life  We also discussed adding escitalopram to help regulate her anger and irritability      HPI ROS Appetite Changes and Sleep: normal appetite    Review Of Systems:     Mood Anxiety   Behavior Normal    Thought Content Normal   General Normal    Personality Normal   Other Psych Symptoms Normal   Constitutional Negative   ENT Negative   Cardiovascular Negative   Respiratory Negative   Gastrointestinal Negative   Genitourinary Negative   Musculoskeletal Negative   Integumentary skin yeast infection   Neurological Negative   Endocrine Normal    Other Symptoms Normal        Substance Abuse History:    Social History     Substance and Sexual Activity   Drug Use Never       Family Psychiatric History:     Family History   Problem Relation Age of Onset    No Known Problems Mother     No Known Problems Father        Social History     Socioeconomic History    Marital status: /Civil Union     Spouse name: Not on file    Number of children: Not on file    Years of education: Not on file    Highest education level: Not on file   Occupational History    Not on file   Social Needs    Financial resource strain: Not on file    Food insecurity     Worry: Not on file     Inability: Not on file    Transportation needs     Medical: Not on file     Non-medical: Not on file   Tobacco Use    Smoking status: Never Smoker    Smokeless tobacco: Never Used   Substance and Sexual Activity    Alcohol use: Yes     Comment: social    Drug use: Never    Sexual activity: Yes     Partners: Male     Birth control/protection: Implant   Lifestyle    Physical activity     Days per week: Not on file     Minutes per session: Not on file    Stress: Not on file   Relationships    Social connections     Talks on phone: Not on file     Gets together: Not on file     Attends Church service: Not on file     Active member of club or organization: Not on file     Attends meetings of clubs or organizations: Not on file     Relationship status: Not on file    Intimate partner violence     Fear of current or ex partner: Not on file     Emotionally abused: Not on file     Physically abused: Not on file     Forced sexual activity: Not on file   Other Topics Concern    Not on file   Social History Narrative    Not on file       Past Medical History:   Diagnosis Date    Acid reflux     Hypertension     after her pregnancy  no longer on BP meds  (19)    Obesity     Pre-diabetes        Past Surgical History:   Procedure Laterality Date    ANKLE SURGERY       SECTION      x 2    COMBINED HYSTEROSCOPY DIAGNOSTIC / D&C  2014    ECTOPIC PREGNANCY SURGERY      GASTRECTOMY SLEEVE LAPAROSCOPIC      TONSILLECTOMY         Current Outpatient Medications   Medication Sig Dispense Refill    ALPRAZolam (XANAX) 0 25 mg tablet Take 0 25 mg by mouth 3 (three) times a day as needed      escitalopram (LEXAPRO) 10 mg tablet Take 1 tablet (10 mg total) by mouth daily 30 tablet 3    hydrocortisone 2 5 % cream Apply topically twice a day 60 g 3    ketoconazole (NIZORAL) 2 % cream Apply topically twice a day 60 g 3    levonorgestrel (MIRENA) 20 MCG/24HR IUD 1 each by Intrauterine route      lisdexamfetamine (VYVANSE) 20 MG capsule Take 1 capsule (20 mg total) by mouth every morningMax Daily Amount: 20 mg 30 capsule 0    nystatin (MYCOSTATIN) powder Apply topically 2 (two) times a day 15 g 4     No current facility-administered medications for this visit  No Known Allergies    The following portions of the patient's history were reviewed and updated as appropriate: allergies, current medications, past family history, past medical history, past social history, past surgical history and problem list     OBJECTIVE:     Mental Status Examination:    Appearance calm and cooperative  and adequate hygiene and grooming   Mood dysphoric   Affect affect appropriate    Speech a normal rate   Thought Processes coherent/organized and normal thought processes   Hallucinations no hallucinations present    Thought Content no delusions   Abnormal Thoughts no suicidal thoughts  and no homicidal thoughts    Orientation  oriented to person and place and time   Remote Memory short term memory intact and long term memory intact   Attention Span concentration intact   Intellect Appears to be Above Average Intelligence   Insight Insight intact   Judgement judgment was intact   Muscle Strength RADHA   Language Normal rate, rythm and volume   Fund of Knowledge Intact   Pain none   Pain Scale 0       Laboratory Results: No results found for this or any previous visit  Assessment/Plan:       Diagnoses and all orders for this visit:    Anxiety  -     escitalopram (LEXAPRO) 10 mg tablet; Take 1 tablet (10 mg total) by mouth daily    Anxiety and depression    Attention deficit hyperactivity disorder (ADHD), combined type    Generalized anxiety disorder          Treatment Recommendations- Risks Benefits      Immediate Medical/Psychiatric/Psychotherapy Treatments and Any Precautions: Continue Vyvanse  Start escitalopram 10 mg qd for anxiety and ittitability    Risks, Benefits And Possible Side Effects Of Medications: Discussed with patient      Controlled Medication Discussion: No records found for controlled prescriptions according to [address] Monitoring Program       Psychotherapy Provided: Discussed autogenic methods to control anxiety and depression  Reinforced adaptive behaviors  Goals discussed in session:Continue to improve attention and concentration, decrease anxiety and irritability    Counseling provided:      Treatment Plan:    Completed and signed during the session: Yes - Treatment Plan done but not signed at time of office visit due to:  Plan reviewed by video and verbal consent given due to [address] distancing    I spent 17 minutes with the patient during this visit  in counseling    Sheeba Gonzáles 04/15/21

## 2021-06-09 DIAGNOSIS — F90.0 ATTENTION DEFICIT HYPERACTIVITY DISORDER, INATTENTIVE TYPE: ICD-10-CM

## 2021-09-30 DIAGNOSIS — F90.0 ATTENTION DEFICIT HYPERACTIVITY DISORDER, INATTENTIVE TYPE: ICD-10-CM

## 2021-09-30 NOTE — TELEPHONE ENCOUNTER
----- Message from [address] [place] [address] sent at 9/30/2021  1:04 PM EDT -----  Regarding: refill  lisdexamfetamine (VYVANSE) 20 MG capsule  [id number] - [place], [address] [place] [address] TR  10/26 GM

## 2021-10-25 ENCOUNTER — OFFICE VISIT (OUTPATIENT)
Dept: URGENT CARE | Facility: CLINIC | Age: 36
End: 2021-10-25
Payer: COMMERCIAL

## 2021-10-25 VITALS
HEIGHT: 71 IN | OXYGEN SATURATION: 97 % | SYSTOLIC BLOOD PRESSURE: 116 MMHG | DIASTOLIC BLOOD PRESSURE: 75 MMHG | RESPIRATION RATE: 18 BRPM | TEMPERATURE: 97.8 F | WEIGHT: 255.8 LBS | HEART RATE: 65 BPM | BODY MASS INDEX: 35.81 KG/M2

## 2021-10-25 DIAGNOSIS — R53.83 FATIGUE, UNSPECIFIED TYPE: Primary | ICD-10-CM

## 2021-10-25 PROCEDURE — 99213 OFFICE O/P EST LOW 20 MIN: CPT | Performed by: PHYSICIAN ASSISTANT

## 2021-10-25 RX ORDER — LORATADINE 10 MG/1
10 TABLET ORAL DAILY PRN
COMMUNITY

## 2022-01-26 DIAGNOSIS — F90.0 ATTENTION DEFICIT HYPERACTIVITY DISORDER, INATTENTIVE TYPE: ICD-10-CM

## 2022-03-24 ENCOUNTER — TELEPHONE (OUTPATIENT)
Dept: PSYCHIATRY | Facility: CLINIC | Age: 37
End: 2022-03-24

## 2022-03-24 NOTE — TELEPHONE ENCOUNTER
Tried to call pt to reschedule her virtual appointment tomorrow 3/25/2022 @ 10:45 am due to provider not available  I had to leave message on her answering machine for her to call back office  Need to reschedule appointment with new date

## 2022-04-21 ENCOUNTER — TELEMEDICINE (OUTPATIENT)
Dept: PSYCHIATRY | Facility: CLINIC | Age: 37
End: 2022-04-21
Payer: COMMERCIAL

## 2022-04-21 DIAGNOSIS — F41.9 ANXIETY: ICD-10-CM

## 2022-04-21 DIAGNOSIS — F90.2 ATTENTION DEFICIT HYPERACTIVITY DISORDER (ADHD), COMBINED TYPE: ICD-10-CM

## 2022-04-21 DIAGNOSIS — F90.2 ADHD (ATTENTION DEFICIT HYPERACTIVITY DISORDER), COMBINED TYPE: Primary | ICD-10-CM

## 2022-04-21 PROCEDURE — 99214 OFFICE O/P EST MOD 30 MIN: CPT | Performed by: NURSE PRACTITIONER

## 2022-04-21 PROCEDURE — 90833 PSYTX W PT W E/M 30 MIN: CPT | Performed by: NURSE PRACTITIONER

## 2022-04-21 RX ORDER — CLONAZEPAM 0.5 MG/1
0.5 TABLET, ORALLY DISINTEGRATING ORAL
Qty: 30 TABLET | Refills: 3 | Status: SHIPPED | OUTPATIENT
Start: 2022-04-21 | End: 2022-07-06

## 2022-04-21 NOTE — BH TREATMENT PLAN
TREATMENT PLAN (Medication Management Only)        [facility]    Name and Date of Birth:  Mali Barker 40 y o  1985  Date of Treatment Plan: April 21, 2022  Diagnosis/Diagnoses:    1  ADHD (attention deficit hyperactivity disorder), combined type    2  Attention deficit hyperactivity disorder (ADHD), combined type    3  Anxiety      Strengths/Personal Resources for Self-Care: supportive family, supportive friends, taking medications as prescribed, ability to communicate well, ability to listen, ability to reason, average or above intelligence, general fund of knowledge, motivation for treatment  Area/Areas of need (in own words): ADHD symptoms, lack of energy  1  Long Term Goal: continue improvement in ADHD symptoms  Target Date:3 months - 7/21/2022  Person/Persons responsible for completion of goal: Trixie  2  Short Term Objective (s) - How will we reach this goal?:   A  Provider new recommended medication/dosage changes and/or continue medication(s): continue current medications as prescribed  B  N/A   C  N/A  Target Date:3 months - 7/21/2022  Person/Persons Responsible for Completion of Goal: Emmanuel Hampton  Progress Towards Goals: continuing treatment  Treatment Modality: medication management every 3 months  Review due 180 days from date of this plan: 3 months - 7/21/2022  Expected length of service: ongoing treatment  My Physician/PA/NP and I have developed this plan together and I agree to work on the goals and objectives  I understand the treatment goals that were developed for my treatment

## 2022-04-21 NOTE — PSYCH
This note was not shared with the patient due to reasonable likelihood of causing patient harm    PROGRESS NOTE        Lenin Soria      Name and Date of Birth:  Andrez Faustin 40 y o  1985    Date of Visit: 04/21/22     After connecting through [employer], the patient was identified by name and date of birth  Andrez Faustin was informed that this is a telemedicine visit and that the visit is being conducted through [address] [place] [address] [facility] [employer] and patient was informed that this is a secure, HIPAA-compliant platform  She agrees to proceed  which may not be secure and therefore, might not be HIPAA-compliant  My office door was closed  No one else was in the room  She acknowledged consent and understanding of privacy and security of the video platform  The patient has agreed to participate and understands they can discontinue the visit at any time  SUBJECTIVE:    Jeromy Willams was seen today for follow-up and medication management  She was casually dressed alert and oriented 3 X and maintained good eye contact  Her speech was clear and of normal rate rhythm and volume  Thought processes were clear and goal-directed  At this point she denied any anxiety or depression  Her attention and concentration are fair  She was not sure that her current dose of Vyvanse was adequate and we agreed to an increase to 30 mg in q a m  [employer] She has also had difficulties initiating sleep and her primary had given her some Ativan  I negotiated does switch to clonazepam 0 5 mg HS p r n  only, and apparently this is exactly how she has been taking her previous medications  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 she works for an [employer] company whom she is in a solid romantic relationship and that she has moved to [place]    Today she endorses no other new symptoms or side effects from her medications      She denies suicidal ideation, intent or plan at present, has no suicidal ideation, intent or plan at present  She denies any auditory hallucinations and visual hallucinations, denies any other delusional thinking, denies any delusional thinking  She denies any side effects from medications    HPI ROS Appetite Changes and Sleep: normal appetite, normal sleep    Review Of Systems:      Constitutional As noted in HPI   ENT As noted in HPI   Cardiovascular As noted in HPI   Respiratory As noted in HPI   Gastrointestinal As noted in HPI   Genitourinary As noted in HPI   Musculoskeletal As noted in HPI   Integumentary As noted in HPI   Neurological As noted in HPI   Endocrine As noted in HPI   Other Symptoms Negative and None       Laboratory Results: No results found for this or any previous visit      Substance Abuse History:    Social History     Substance and Sexual Activity   Drug Use Never       Family Psychiatric History:     Family History   Problem Relation Age of Onset    No Known Problems Mother     No Known Problems Father        The following portions of the patient's history were reviewed and updated as appropriate: past family history, past medical history, past social history, past surgical history and problem list     Social History     Socioeconomic History    Marital status: /Civil Union     Spouse name: Not on file    Number of children: Not on file    Years of education: Not on file    Highest education level: Not on file   Occupational History    Not on file   Tobacco Use    Smoking status: Never Smoker    Smokeless tobacco: Never Used   Vaping Use    Vaping Use: Never used   Substance and Sexual Activity    Alcohol use: Not Currently    Drug use: Never    Sexual activity: Yes     Partners: Male     Birth control/protection: Implant   Other Topics Concern    Not on file   Social History Narrative    Not on file     Social Determinants of Health     Financial Resource Strain: Not on file   Food Insecurity: Not on file   Transportation Needs: Not on file   Physical Activity: Not on file   Stress: Not on file   Social Connections: Not on file   Intimate Partner Violence: Not on file   Housing Stability: Not on file     Social History     Social History Narrative    Not on file        Social History     Tobacco History     Smoking Status  Never Smoker    Smokeless Tobacco Use  Never Used          Alcohol History     Alcohol Use Status  Not Currently          Drug Use     Drug Use Status  Never          Sexual Activity     Sexually Active  Yes Partners  Male Birth Control/Protection  Implant          Activities of Daily Living    Not Asked                     OBJECTIVE:     Mental Status Evaluation:    Appearance age appropriate, casually dressed   Behavior pleasant, cooperative   Speech normal volume, normal pitch   Mood appropriate   Affect F&A   Thought Processes logical   Associations intact associations   Thought Content normal   Perceptual Disturbances: none   Abnormal Thoughts  Risk Potential Suicidal ideation - None  Homicidal ideation - None  Potential for aggression - No   Orientation oriented to person, place, time/date and situation   Memory recent and remote memory grossly intact   Cosciousness alert and awake   Attention Span attention span and concentration are age appropriate   Intellect Appears to be of Average Intelligence   Insight age appropriate    Judgement good    Muscle Strength and  Gait muscle strength and tone were normal   Language Intact   Fund of Knowledge Intact   Pain none   Pain Scale 0       Assessment/Plan:       Diagnoses and all orders for this visit:    ADHD (attention deficit hyperactivity disorder), combined type  -     lisdexamfetamine (Vyvanse) 30 MG capsule;  Take 1 capsule (30 mg total) by mouth every morning Max Daily Amount: 30 mg    Attention deficit hyperactivity disorder (ADHD), combined type    Anxiety  -     clonazePAM (KlonoPIN) 0 5 MG disintegrating tablet; Take 1 tablet (0 5 mg total) by mouth daily at bedtime          Treatment Recommendations/Precautions:    Continue current medications:    Risks/Benefits      Risks, Benefits And Possible Side Effects Of Medications:    Risks, benefits, and possible side effects of medications explained to patient and patient verbalizes understanding and agreement for treatment  Controlled Medication Discussion:   Discussed with patient the risks of sedation, respiratory depression, impairment of ability to drive and potential for abuse and addiction related to treatment with benzodiazepine medications  The patient understands risk of treatment with benzodiazepine medications, agrees to not drive if feels impaired and agrees to take medications as prescribed  Psychotherapy Provided:   Today I spent 30 minutes counseling with Roberto Moore    Individual psychotherapy provided: No

## 2022-05-02 ENCOUNTER — ANNUAL EXAM (OUTPATIENT)
Dept: OBGYN CLINIC | Facility: CLINIC | Age: 37
End: 2022-05-02
Payer: COMMERCIAL

## 2022-05-02 VITALS
DIASTOLIC BLOOD PRESSURE: 86 MMHG | BODY MASS INDEX: 39.11 KG/M2 | HEIGHT: 70 IN | SYSTOLIC BLOOD PRESSURE: 110 MMHG | WEIGHT: 273.2 LBS

## 2022-05-02 DIAGNOSIS — R10.2 PELVIC PAIN: ICD-10-CM

## 2022-05-02 DIAGNOSIS — N64.59 INVERSION OF LEFT NIPPLE: ICD-10-CM

## 2022-05-02 DIAGNOSIS — Z97.5 IUD CONTRACEPTION: ICD-10-CM

## 2022-05-02 DIAGNOSIS — Z01.419 ROUTINE GYNECOLOGICAL EXAMINATION: Primary | ICD-10-CM

## 2022-05-02 PROCEDURE — 99395 PREV VISIT EST AGE 18-39: CPT | Performed by: PHYSICIAN ASSISTANT

## 2022-05-02 NOTE — PROGRESS NOTES
Patient is here for annual exam   She states she feels ramsey and has had cramping for the past 6 months  She hasn't had a menstrual cycle in many years  3 months after delivery she had Mirena inserted  LMP:unkknown  Patient is sexually active  Patient does not desire STD testing  Birth control method: Mirena  Patient hasn't completed Gardasil series  Last Pap: 11/5/2020  Pap: neg/HPV: neg    Family history of breast, uterine or colon cancer: no

## 2022-05-02 NOTE — PROGRESS NOTES
Assessment   40 y o  [id number] presenting for annual exam      Plan   Diagnoses and all orders for this visit:    Routine gynecological examination  Normal findings on routine exam   Encouraged 150 min of exercise per week  Reviewed balanced diet  Multivitamin encouraged   Breast awareness/SBE encouraged    IUD contraception   Mirena IUD present since 2/13/2018   Withdrawal bleeds are light and infrequent with IUD  Shalonda Perera is happy with this and desires to continue  Aware of symptoms to report  Due for removal or swab 2/2025  Pelvic pain  -     US pelvis complete w transvaginal; Future    Intermittent pelvic pain reported  Has previously complained of the same, as far back as 2020  At the time had negative Pap and GC/CT  Will obtain US to ensure proper positioning of IUD  Declines STD testing today  Encouraged tracking to determine if cyclic vs sporadic  Inversion of left nipple  Reported as her baseline  Pap due 2025  Mammo due age 40         RTO one year for yearly exam or sooner as needed  __________________________________________________________________    Subjective     Altagracia Mcdaniel is a 40 y o  [id number], amenorrheic on IUD, who is sexually active and currently using Mirena (inserted 2/13/2018) for contraception presenting for annual exam  She does not want STD testing today  Complaints: see under GYN    SCREENING  Last Pap: 11/05/2020 NILM/Neg  Last Mammo: N/A  Last Colonoscopy: N/A      GYN  Complaints: Complains of intermittent pelvic pain and moodiness, and wonders if secondary to IUD  Had IUD inserted 2/2018  Feels moodiness was first noted in past few years, but unsure of exact onset  Describes the moodiness as increased stress/anxiety, and easy to anger  This seems persistent but has periods of increased severity  Previously on Lexapro for the same, but not currently   Does follow with psychiatrist  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 [facility]  Has noted increased work load, and feeling taxed by persistent severity of her patient's problems ("seeing them on their worst day" and those suffering from drug addiction)  Left sided pelvic cramping has been ongoing for at least 6 months  Increased stress and 15-20 lb weight gain in this time  Is s/p Gastric sleeve in early 2020  Has not been tracking cramping, usually mild, lasting a day or so  Rarely treats  Pertinent hx includes PCOS, hx of left ectopic pregnancy  Suffered from migraines with aura prior to gastric sleeve, now only 1 migraine--no aura since weight loss surgery  Denies vaginal discharge, itching, odor, dryness, dysmenorrhea, dyspareunia, irregular/heavy menses, and vulvar/vaginal symptoms  Periods amenorrheic on Mirena   Cyclic symptoms include moodiness  Sexually active: Yes - single partner -   Hx STI: denies   Hx Abnormal pap: denies    We reviewed ASCCP guidelines for Pap testing today  This low risk patient meets criteria for q 5 year testing  Gardasil: She has not completed the Gardasil series  OB  [id number]   1 right ectopic pregnancy in 2014   1 miscarriage (twins) 2014   1 vaginal delivery 2016   Twins born in 2017      Complaints: denies  Denies urgency, frequency, hematuria, leakage / change in stream, difficulty urinating  BREAST  Complaints: denies  Denies: breast lump, breast tenderness, dryness, nipple discharge, pruritus, skin color change, and skin lesion(s)  Personal hx: none reported    Pertinent Family Hx:   Family hx of breast cancer: no  Family hx of ovarian cancer: no  Family hx of colon cancer: no      GENERAL  PMH reviewed/updated and is as below  Patient does follow with a PCP  Past Medical History:   Diagnosis Date    Acid reflux     Allergic rhinitis     Anxiety     Depression     GERD (gastroesophageal reflux disease)     Hypertension     after her pregnancy  no longer on BP meds  (7/24/19)    Obesity     Pre-diabetes  Urinary tract infection        Past Surgical History:   Procedure Laterality Date    ANKLE SURGERY      APPENDECTOMY           SECTION      x 2    COMBINED HYSTEROSCOPY DIAGNOSTIC / D&C      ECTOPIC PREGNANCY SURGERY      GASTRECTOMY SLEEVE LAPAROSCOPIC      TONSILLECTOMY           Current Outpatient Medications:     clonazePAM (KlonoPIN) 0 5 MG disintegrating tablet, Take 1 tablet (0 5 mg total) by mouth daily at bedtime, Disp: 30 tablet, Rfl: 3    levonorgestrel (MIRENA) 20 MCG/24HR IUD, 1 each by Intrauterine route, Disp: , Rfl:     lisdexamfetamine (Vyvanse) 30 MG capsule, Take 1 capsule (30 mg total) by mouth every morning Max Daily Amount: 30 mg, Disp: 30 capsule, Rfl: 0    loratadine (CLARITIN) 10 mg tablet, Take 10 mg by mouth daily as needed for allergies, Disp: , Rfl:     Multiple Vitamins-Minerals (MULTIVITAMIN ADULT, MINERALS, PO), Take 1 tablet by mouth daily, Disp: , Rfl:     No Known Allergies    Social History     Socioeconomic History    Marital status: /Civil Union     Spouse name: Not on file    Number of children: Not on file    Years of education: Not on file    Highest education level: Not on file   Occupational History    Not on file   Tobacco Use    Smoking status: Never Smoker    Smokeless tobacco: Never Used   Vaping Use    Vaping Use: Never used   Substance and Sexual Activity    Alcohol use: Not Currently    Drug use: Never    Sexual activity: Yes     Partners: Male     Birth control/protection: Implant     Comment: Mirena 2018   Other Topics Concern    Not on file   Social History Narrative    Not on file     Social Determinants of Health     Financial Resource Strain: Not on file   Food Insecurity: Not on file   Transportation Needs: Not on file   Physical Activity: Not on file   Stress: Not on file   Social Connections: Not on file   Intimate Partner Violence: Not on file   Housing Stability: Not on file       Review of Systems ROS:  Constitutional: Negative for appetite change, fatigue and unexpected weight change  Respiratory: Negative for cough, wheezing, shortness of breath  Cardiovascular: Negative for chest pain, palpitations, and leg swelling  Gastrointestinal: Negative for abdominal pain and change in bowel habits/constipation/diarrhea  Breasts:  Negative for tenderness, pain or masses  Genitourinary: +Pelvic pain  Negative for difficulty urinating, pelvic pain, vaginal bleeding, vaginal discharge, itching or odor  Psychiatric: + Moodiness  Neg SI or HI  Objective      /86 (BP Location: Left arm, Patient Position: Sitting, Cuff Size: Large)   Ht 5' 10" (1 778 m)   Wt 124 kg (273 lb 3 2 oz)   LMP  (LMP Unknown)   BMI 39 20 kg/m²     Physical Examination:    Patient appears well and is not in distress  Neck is supple without masses  Breasts are symmetrical without mass, tenderness, nipple discharge, skin changes or adenopathy  Left nipple inversion -- is her baseline  Abdomen is soft and nontender without masses  External genitals are normal without lesions or rashes  Urethral meatus and urethra are normal  Bladder is normal to palpation  Vagina is normal without discharge or bleeding  Cervix is normal without discharge or lesion  IUD strings visualized  Uterus is normal, mobile, nontender without palpable mass  Adnexa are normal, nontender, without palpable mass

## 2022-05-02 NOTE — PATIENT INSTRUCTIONS
Try to get 150 minutes of exercise per week  Eat a balanced diet avoiding sugary beverages, refined sugars, and processed foods  Try to eat a combined 5-6 servings of fruits and vegetables per day  Try to do monthly breast exams, or frequently enough that you are aware if there are any changes in the appearance or texture  Call the office with any change  Take a multivitamin  Return in 1 year for your yearly exam or sooner as needed  Erythromycin Pregnancy And Lactation Text: This medication is Pregnancy Category B and is considered safe during pregnancy. It is also excreted in breast milk.

## 2022-05-05 ENCOUNTER — TELEPHONE (OUTPATIENT)
Dept: PSYCHIATRY | Facility: CLINIC | Age: 37
End: 2022-05-05

## 2022-05-05 NOTE — TELEPHONE ENCOUNTER
Pt left a voicemail requesting a refill of Lexapro    Returned pts call to clarify the medication refill  I didn't see Lexapro on her list of medications  She stated it was last filled June of 2021  She is requesting Lexapro 10 mg    She forgot to mention she wanted a refill at her last appt 4/21/22     [id number] - [place], [phone]

## 2022-05-25 ENCOUNTER — HOSPITAL ENCOUNTER (OUTPATIENT)
Dept: RADIOLOGY | Facility: HOSPITAL | Age: 37
Discharge: HOME | End: 2022-05-25
Payer: COMMERCIAL

## 2022-05-25 DIAGNOSIS — R10.2 PELVIC PAIN: ICD-10-CM

## 2022-05-25 PROCEDURE — 76856 US EXAM PELVIC COMPLETE: CPT

## 2022-05-25 PROCEDURE — 76830 TRANSVAGINAL US NON-OB: CPT

## 2022-05-27 ENCOUNTER — TELEPHONE (OUTPATIENT)
Dept: OBGYN CLINIC | Facility: CLINIC | Age: 37
End: 2022-05-27

## 2022-05-27 NOTE — TELEPHONE ENCOUNTER
----- Message from Peter Barrera sent at 5/27/2022  3:55 PM EDT -----  Regarding: Dr Shannan Cedeno   I was curious if my ultrasound results came back yet?

## 2022-06-20 ENCOUNTER — OFFICE VISIT (OUTPATIENT)
Dept: FAMILY MEDICINE CLINIC | Facility: CLINIC | Age: 37
End: 2022-06-20
Payer: COMMERCIAL

## 2022-06-20 VITALS
RESPIRATION RATE: 14 BRPM | HEART RATE: 72 BPM | SYSTOLIC BLOOD PRESSURE: 120 MMHG | TEMPERATURE: 98.3 F | HEIGHT: 70 IN | DIASTOLIC BLOOD PRESSURE: 80 MMHG | WEIGHT: 280 LBS | BODY MASS INDEX: 40.09 KG/M2

## 2022-06-20 DIAGNOSIS — Z00.00 ANNUAL PHYSICAL EXAM: Primary | ICD-10-CM

## 2022-06-20 DIAGNOSIS — R19.7 DIARRHEA, UNSPECIFIED TYPE: ICD-10-CM

## 2022-06-20 DIAGNOSIS — E66.01 MORBID OBESITY (HCC): ICD-10-CM

## 2022-06-20 PROBLEM — B37.31 YEAST INFECTION OF THE VAGINA: Status: RESOLVED | Noted: 2020-11-05 | Resolved: 2022-06-20

## 2022-06-20 PROBLEM — B37.2 SKIN YEAST INFECTION: Status: RESOLVED | Noted: 2020-11-05 | Resolved: 2022-06-20

## 2022-06-20 PROBLEM — B37.3 YEAST INFECTION OF THE VAGINA: Status: RESOLVED | Noted: 2020-11-05 | Resolved: 2022-06-20

## 2022-06-20 PROCEDURE — 99385 PREV VISIT NEW AGE 18-39: CPT | Performed by: FAMILY MEDICINE

## 2022-06-20 PROCEDURE — 1036F TOBACCO NON-USER: CPT | Performed by: FAMILY MEDICINE

## 2022-06-20 PROCEDURE — 3725F SCREEN DEPRESSION PERFORMED: CPT | Performed by: FAMILY MEDICINE

## 2022-06-20 PROCEDURE — 3008F BODY MASS INDEX DOCD: CPT | Performed by: FAMILY MEDICINE

## 2022-06-20 NOTE — PROGRESS NOTES
Chief Complaint   Patient presents with   [facility]     New pt    Physical Exam        Patient ID: Andrez Faustin is a 40 y o  female  HPI  Pt is seeing for CPE     The following portions of the patient's history were reviewed and updated as appropriate: allergies, current medications, past family history, past medical history, past social history, past surgical history and problem list     Review of Systems   Constitutional: Negative for fatigue, fever and unexpected weight change  HENT: Negative for congestion, ear discharge, ear pain, hearing loss, rhinorrhea, sinus pressure, sore throat and trouble swallowing  Eyes: Negative  Respiratory: Negative  Cardiovascular: Negative  Gastrointestinal: Positive for anal bleeding (has hemorrhoids ) and diarrhea (last 2-3 wks )  Negative for abdominal pain  Endocrine: Negative  Genitourinary: Negative  Musculoskeletal: Negative  Skin: Negative  Neurological: Negative for dizziness, weakness, light-headedness and numbness  Hematological: Negative  Psychiatric/Behavioral: Negative  Current Outpatient Medications   Medication Sig Dispense Refill    clonazePAM (KlonoPIN) 0 5 MG disintegrating tablet Take 1 tablet (0 5 mg total) by mouth daily at bedtime 30 tablet 3    escitalopram (Lexapro) 10 mg tablet Take 1 tablet (10 mg total) by mouth daily 30 tablet 3    levonorgestrel (MIRENA) 20 MCG/24HR IUD 1 each by Intrauterine route      loratadine (CLARITIN) 10 mg tablet Take 10 mg by mouth daily as needed for allergies      Multiple Vitamins-Minerals (MULTIVITAMIN ADULT, MINERALS, PO) Take 1 tablet by mouth daily              No current facility-administered medications for this visit         Objective:    /80 (BP Location: Left arm, Patient Position: Sitting, Cuff Size: Adult)   Pulse 72   Temp 98 3 °F (36 8 °C) (Temporal)   Resp 14   Ht 5' 10" (1 778 m)   Wt 127 kg (280 lb)   BMI 40 18 kg/m² Physical Exam  Constitutional:       General: She is not in acute distress  Appearance: She is well-developed  She is obese  She is not ill-appearing  HENT:      Head: Normocephalic and atraumatic  Right Ear: Hearing, tympanic membrane, ear canal and external ear normal       Left Ear: Hearing, tympanic membrane, ear canal and external ear normal       Nose: No congestion or rhinorrhea  Mouth/Throat:      Pharynx: No oropharyngeal exudate or posterior oropharyngeal erythema  Eyes:      Extraocular Movements: Extraocular movements intact  Conjunctiva/sclera: Conjunctivae normal    Neck:      Thyroid: No thyroid mass or thyromegaly  Vascular: No JVD  Cardiovascular:      Rate and Rhythm: Normal rate and regular rhythm  Heart sounds: Normal heart sounds  No murmur heard  No gallop  Pulmonary:      Effort: No respiratory distress  Breath sounds: Normal breath sounds  No wheezing, rhonchi or rales  Abdominal:      General: Bowel sounds are normal       Palpations: Abdomen is soft  Tenderness: There is no abdominal tenderness  Musculoskeletal:      Cervical back: Neck supple  Right lower leg: No edema  Left lower leg: No edema  Lymphadenopathy:      Cervical: No cervical adenopathy  Neurological:      General: No focal deficit present  Mental Status: She is alert and oriented to person, place, and time  Cranial Nerves: No cranial nerve deficit  Psychiatric:         Mood and Affect: Mood normal          Behavior: Behavior normal          Thought Content: Thought content normal          Judgment: Judgment normal            Labs in chart were reviewed  Assessment/Plan:         Diagnoses and all orders for this visit:    Annual physical exam  -     CBC; Future  -     Comprehensive metabolic panel; Future  -     Hemoglobin A1C; Future  -     TSH, 3rd generation; Future  -     Lipid panel; Future  -     Iron;  Future  -     Vitamin D 25 hydroxy; Future  -     Folate; Future  -     Vitamin B12; Future  -     Vitamin A; Future  -     CBC  -     Comprehensive metabolic panel  -     Hemoglobin A1C  -     TSH, 3rd generation  -     Lipid panel  -     Iron  -     Vitamin D 25 hydroxy  -     Folate  -     Vitamin B12  -     Vitamin A    Morbid obesity (HCC)  -     Ambulatory Referral to Weight Management; Future    Diarrhea, unspecified type  -     Ambulatory Referral to Gastroenterology; Future            BMI Counseling: Body mass index is 40 18 kg/m²  Discussed the patient's BMI with her  The BMI is above normal  Nutrition recommendations include reducing portion sizes, decreasing overall calorie intake, 3-5 servings of fruits/vegetables daily, reducing fast food intake, consuming healthier snacks, decreasing soda and/or juice intake and moderation in carbohydrate intake  Exercise recommendations include exercising 3-5 times per week  Patient referred to weight management due to patient being morbidly obese           rto in 1 y         Dru Paredes MD

## 2022-07-06 ENCOUNTER — TELEMEDICINE (OUTPATIENT)
Dept: PSYCHIATRY | Facility: CLINIC | Age: 37
End: 2022-07-06
Payer: COMMERCIAL

## 2022-07-06 DIAGNOSIS — F90.0 ATTENTION DEFICIT HYPERACTIVITY DISORDER (ADHD), PREDOMINANTLY INATTENTIVE TYPE: ICD-10-CM

## 2022-07-06 DIAGNOSIS — F41.1 GENERALIZED ANXIETY DISORDER: Primary | ICD-10-CM

## 2022-07-06 PROCEDURE — 99214 OFFICE O/P EST MOD 30 MIN: CPT | Performed by: NURSE PRACTITIONER

## 2022-07-06 PROCEDURE — 90833 PSYTX W PT W E/M 30 MIN: CPT | Performed by: NURSE PRACTITIONER

## 2022-07-06 RX ORDER — CLONAZEPAM 0.5 MG/1
0.5 TABLET ORAL 2 TIMES DAILY
Qty: 30 TABLET | Refills: 5 | Status: SHIPPED | OUTPATIENT
Start: 2022-07-06

## 2022-07-06 RX ORDER — BUPROPION HYDROCHLORIDE 75 MG/1
75 TABLET ORAL 2 TIMES DAILY
Qty: 30 TABLET | Refills: 5 | Status: SHIPPED | OUTPATIENT
Start: 2022-07-06

## 2022-07-06 NOTE — BH TREATMENT PLAN
TREATMENT PLAN (Medication Management Only)        [facility]    Name and Date of Birth:  Vianey Kern 40 y o  1985  Date of Treatment Plan: July 6, 2022  Diagnosis/Diagnoses:    1  Generalized anxiety disorder    2  Attention deficit hyperactivity disorder (ADHD), predominantly inattentive type      Strengths/Personal Resources for Self-Care: supportive family, supportive friends, taking medications as prescribed, ability to communicate well, ability to listen, average or above intelligence, general fund of knowledge, motivation for treatment  Area/Areas of need (in own words): anxiety symptoms, ADHD symptoms  1  Long Term Goal: continue improvement in ADHD symptoms  Target Date:3 months - 10/6/2022  Person/Persons responsible for completion of goal: Trixie Mondragon  Short Term Objective (s) - How will we reach this goal?:   A  Provider new recommended medication/dosage changes and/or continue medication(s): continue current medications as prescribed  B  N/A   C  N/A  Target Date:3 months - 10/6/2022  Person/Persons Responsible for Completion of Goal: [place]  Progress Towards Goals: continuing treatment  Treatment Modality: medication management every 3 months  Review due 180 days from date of this plan: 3 months - 10/6/2022  Expected length of service: ongoing treatment  My Physician/PA/NP and I have developed this plan together and I agree to work on the goals and objectives  I understand the treatment goals that were developed for my treatment

## 2022-07-06 NOTE — PSYCH
This note was not shared with the patient due to reasonable likelihood of causing patient harm      PROGRESS NOTE        Lenin Soria      Name and Date of Birth:  Tameka Clement 40 y o  1985    Date of Visit: 07/06/22     After connecting through [email or web address], the patient was identified by name and date of birth  Tameka Clement was informed that this is a telemedicine visit and that the visit is being conducted through [address] [place] [address] Road [employer] and patient was informed that this is a secure, HIPAA-compliant platform  She agrees to proceed  which may not be secure and therefore, might not be HIPAA-compliant  My office door was closed  No one else was in the room  She acknowledged consent and understanding of privacy and security of the video platform  The patient has agreed to participate and understands they can discontinue the visit at any time    SUBJECTIVE:    Dylon Nguyen was seen today for follow-up and medication management  She was casually dressed, alert and oriented 3 X and maintained good eye contact  Her speech was clear and of normal rate rhythm and volume  Thought processes were clear and goal-directed  She has been having some sexual side effects as a result of Lexapro and today we agreed to a trial of Wellbutrin 75 mg daily  That Dorene Monte concentration is good, no depression reported  Today she reported no new symptoms or side effects from her medications      She denies suicidal ideation, intent or plan at present, has no suicidal ideation, intent or plan at present  She denies any auditory hallucinations and visual hallucinations, denies any other delusional thinking, denies any delusional thinking  She denies any side effects from medications      HPI ROS Appetite Changes and Sleep: normal appetite, normal sleep    Review Of Systems:      Constitutional As noted in HPI   ENT As noted in HPI   Cardiovascular As noted in HPI   Respiratory As noted in HPI   Gastrointestinal As noted in HPI   Genitourinary As noted in HPI   Musculoskeletal As noted in HPI   Integumentary As noted in HPI   Neurological As noted in HPI   Endocrine As noted in HPI   Other Symptoms Negative and None       Laboratory Results: No results found for this or any previous visit      Substance Abuse History:    Social History     Substance and Sexual Activity   Drug Use Never       Family Psychiatric History:     Family History   Problem Relation Age of Onset    Heart failure Mother     Pulmonary embolism Father        The following portions of the patient's history were reviewed and updated as appropriate: past family history, past medical history, past social history, past surgical history and problem list     Social History     Socioeconomic History    Marital status:      Spouse name: Not on file    Number of children: Not on file    Years of education: Not on file    Highest education level: Not on file   Occupational History    Not on file   Tobacco Use    Smoking status: Never Smoker    Smokeless tobacco: Never Used   Vaping Use    Vaping Use: Never used   Substance and Sexual Activity    Alcohol use: Not Currently    Drug use: Never    Sexual activity: Yes     Partners: Male     Birth control/protection: Implant     Comment: Mirena 2/2018   Other Topics Concern    Not on file   Social History Narrative    Not on file     Social Determinants of Health     Financial Resource Strain: Not on file   Food Insecurity: Not on file   Transportation Needs: Not on file   Physical Activity: Not on file   Stress: Not on file   Social Connections: Not on file   Intimate Partner Violence: Not on file   Housing Stability: Not on file     Social History     Social History Narrative    Not on file        Social History     Tobacco History     Smoking Status  Never Smoker    Smokeless Tobacco Use  Never Used          Alcohol History     Alcohol Use Status  Not Currently Drug Use     Drug Use Status  Never          Sexual Activity     Sexually Active  Yes Partners  Male Birth Control/Protection  Implant Comment  Mirena 2/2018          Activities of Daily Living    Not Asked                     OBJECTIVE:     Mental Status Evaluation:    Appearance age appropriate, casually dressed   Behavior pleasant, cooperative   Speech normal volume, normal pitch   Mood Appropriate   Affect Full and appropriate   Thought Processes logical   Associations intact associations   Thought Content normal   Perceptual Disturbances: none   Abnormal Thoughts  Risk Potential Suicidal ideation - None  Homicidal ideation - None  Potential for aggression - No   Orientation oriented to person, place, time/date and situation   Memory recent and remote memory grossly intact   Cosciousness alert and awake   Attention Span attention span and concentration are age appropriate   Intellect Appears to be of Average Intelligence   Insight age appropriate    Judgement good    Muscle Strength and  Gait muscle strength and tone were normal   Language Intact   Fund of Knowledge Intact   Pain none   Pain Scale 0       Assessment/Plan:       Diagnoses and all orders for this visit:    Generalized anxiety disorder  -     clonazePAM (KlonoPIN) 0 5 mg tablet; Take 1 tablet (0 5 mg total) by mouth 2 (two) times a day  -     buPROPion (WELLBUTRIN) 75 mg tablet; Take 1 tablet (75 mg total) by mouth 2 (two) times a day    Attention deficit hyperactivity disorder (ADHD), predominantly inattentive type          Treatment Recommendations/Precautions:    Continue current medications:    Risks/Benefits      Risks, Benefits And Possible Side Effects Of Medications:    Risks, benefits, and possible side effects of medications explained to patient and patient verbalizes understanding and agreement for treatment  Controlled Medication Discussion:         Psychotherapy Provided:     Individual psychotherapy provided:   Today I spent 20 minutes counseling with Shelby Saavedra

## 2022-07-19 ENCOUNTER — TELEPHONE (OUTPATIENT)
Dept: BARIATRICS | Facility: CLINIC | Age: 37
End: 2022-07-19

## 2022-07-19 NOTE — TELEPHONE ENCOUNTER
Patient is a previous surgical patient, out side of [facility]  Had the sleeve Sx back in 2020  Patient is not interested in revision and needs help with weight gain  Patient previously filled out transfer sheet which has been reviewed by Surgeon and   Patient is to be scheduled with Wilfrido Jacobo for 1 hour  Left patient a voicemail and also left a [facility] msg to call office back and schedule an appt

## 2022-08-01 PROBLEM — K91.2 POSTSURGICAL MALABSORPTION: Status: ACTIVE | Noted: 2022-08-01

## 2022-08-01 PROBLEM — Z48.815 ENCOUNTER FOR SURGICAL AFTERCARE FOLLOWING SURGERY OF DIGESTIVE SYSTEM: Status: ACTIVE | Noted: 2022-08-01

## 2022-08-05 PROCEDURE — 99283 EMERGENCY DEPT VISIT LOW MDM: CPT

## 2022-08-06 ENCOUNTER — HOSPITAL ENCOUNTER (EMERGENCY)
Facility: HOSPITAL | Age: 37
Discharge: HOME | End: 2022-08-06
Attending: EMERGENCY MEDICINE
Payer: COMMERCIAL

## 2022-08-06 VITALS
RESPIRATION RATE: 20 BRPM | BODY MASS INDEX: 40.09 KG/M2 | HEART RATE: 102 BPM | HEIGHT: 70 IN | TEMPERATURE: 99.7 F | SYSTOLIC BLOOD PRESSURE: 121 MMHG | DIASTOLIC BLOOD PRESSURE: 71 MMHG | OXYGEN SATURATION: 94 % | WEIGHT: 280 LBS

## 2022-08-06 DIAGNOSIS — B34.9 VIRAL ILLNESS: ICD-10-CM

## 2022-08-06 DIAGNOSIS — R50.9 FEVER: Primary | ICD-10-CM

## 2022-08-06 LAB
BILIRUB UR QL STRIP: NEGATIVE
CLARITY UR: CLEAR
COLOR UR: NORMAL
GLUCOSE UR STRIP-MCNC: NEGATIVE MG/DL
HGB UR QL STRIP.AUTO: NEGATIVE
KETONES UR STRIP-MCNC: NEGATIVE MG/DL
LEUKOCYTE ESTERASE UR QL STRIP: NEGATIVE
NITRITE UR QL STRIP: NEGATIVE
PH UR STRIP.AUTO: 6.5 [PH]
PROT UR STRIP-MCNC: NEGATIVE MG/DL
SARS-COV-2 RNA RESP QL NAA+PROBE: POSITIVE
SP GR UR STRIP.AUTO: 1.01 (ref 1–1.03)
UROBILINOGEN UR QL STRIP.AUTO: 0.2 E.U./DL

## 2022-08-06 PROCEDURE — 81003 URINALYSIS AUTO W/O SCOPE: CPT | Performed by: EMERGENCY MEDICINE

## 2022-08-06 PROCEDURE — U0005 INFEC AGEN DETEC AMPLI PROBE: HCPCS | Performed by: EMERGENCY MEDICINE

## 2022-08-06 PROCEDURE — 99284 EMERGENCY DEPT VISIT MOD MDM: CPT | Performed by: EMERGENCY MEDICINE

## 2022-08-06 PROCEDURE — U0003 INFECTIOUS AGENT DETECTION BY NUCLEIC ACID (DNA OR RNA); SEVERE ACUTE RESPIRATORY SYNDROME CORONAVIRUS 2 (SARS-COV-2) (CORONAVIRUS DISEASE [COVID-19]), AMPLIFIED PROBE TECHNIQUE, MAKING USE OF HIGH THROUGHPUT TECHNOLOGIES AS DESCRIBED BY CMS-2020-01-R: HCPCS | Performed by: EMERGENCY MEDICINE

## 2022-08-06 RX ORDER — BUTALBITAL, ACETAMINOPHEN AND CAFFEINE 50; 325; 40 MG/1; MG/1; MG/1
2 TABLET ORAL ONCE
Status: COMPLETED | OUTPATIENT
Start: 2022-08-06 | End: 2022-08-06

## 2022-08-06 RX ADMIN — BUTALBITAL, ACETAMINOPHEN AND CAFFEINE 2 TABLET: 50; 325; 40 TABLET ORAL at 00:40

## 2022-08-06 NOTE — ED PROVIDER NOTES
History  Chief Complaint   Patient presents with    Flu Symptoms     Patient states she just returned from [place] and yesterday started with severe headache, fever, body aches, took advil at home     39 yo female was coming home from vacation trip to [place] this afternoon and started with fever about 1 pm   + associated sweats, body aches and headache  No vomiting or diarrhea  No cough or congestion  No sore throat  Has been taking tylenol and advil which has helped with the fever but not the headache  Temp was 102 7 at home  Pt  Does have h/o migraine  History provided by:  Patient   used: No    Flu Symptoms  Presenting symptoms: fever, headache and myalgias    Presenting symptoms: no cough, no diarrhea, no nausea, no shortness of breath and no vomiting    Associated symptoms: chills        Prior to Admission Medications   Prescriptions Last Dose Informant Patient Reported? Taking? Multiple Vitamins-Minerals (MULTIVITAMIN ADULT, MINERALS, PO)   Yes No   Sig: Take 1 tablet by mouth daily   buPROPion (WELLBUTRIN) 75 mg tablet   No No   Sig: Take 1 tablet (75 mg total) by mouth 2 (two) times a day   clonazePAM (KlonoPIN) 0 5 mg tablet   No No   Sig: Take 1 tablet (0 5 mg total) by mouth 2 (two) times a day   escitalopram (Lexapro) 10 mg tablet   No No   Sig: Take 1 tablet (10 mg total) by mouth daily   levonorgestrel (MIRENA) 20 MCG/24HR IUD   Yes No   Si each by Intrauterine route   loratadine (CLARITIN) 10 mg tablet   Yes No   Sig: Take 10 mg by mouth daily as needed for allergies      Facility-Administered Medications: None       Past Medical History:   Diagnosis Date    Acid reflux     Allergic rhinitis     Anxiety     Depression     GERD (gastroesophageal reflux disease)     Hypertension     after her pregnancy  no longer on BP meds  (19)    Obesity     Pre-diabetes     Urinary tract infection        Past Surgical History:   Procedure Laterality Date    ANKLE SURGERY      APPENDECTOMY           SECTION      x 2    COMBINED HYSTEROSCOPY DIAGNOSTIC / D&C      ECTOPIC PREGNANCY SURGERY      GASTRECTOMY SLEEVE LAPAROSCOPIC      TONSILLECTOMY         Family History   Problem Relation Age of Onset    Heart failure Mother     Pulmonary embolism Father      I have reviewed and agree with the history as documented  E-Cigarette/Vaping    E-Cigarette Use Never User      E-Cigarette/Vaping Substances    Nicotine No     THC No     CBD No     Flavoring No     Other No     Unknown No      Social History     Tobacco Use    Smoking status: Never Smoker    Smokeless tobacco: Never Used   Vaping Use    Vaping Use: Never used   Substance Use Topics    Alcohol use: Not Currently    Drug use: Never       Review of Systems   Constitutional: Positive for chills and fever  HENT: Negative  Eyes: Negative  Respiratory: Negative  Negative for cough and shortness of breath  Cardiovascular: Negative  Negative for chest pain  Gastrointestinal: Negative  Negative for abdominal pain, diarrhea, nausea and vomiting  Genitourinary: Positive for dysuria  Negative for flank pain  Musculoskeletal: Positive for myalgias  Negative for back pain  Skin: Negative  Negative for rash  Neurological: Positive for headaches  Negative for dizziness  Hematological: Does not bruise/bleed easily  Psychiatric/Behavioral: Negative  All other systems reviewed and are negative  Physical Exam  Physical Exam  Vitals and nursing note reviewed  Constitutional:       General: She is not in acute distress  Appearance: She is well-developed  She is not ill-appearing or diaphoretic  Comments: Pulse ox recheck 97% on RA while talking to me   HENT:      Head: Normocephalic and atraumatic        Right Ear: Tympanic membrane and external ear normal       Left Ear: Tympanic membrane and external ear normal       Nose: Nose normal  Mouth/Throat:      Mouth: Mucous membranes are moist       Pharynx: Oropharynx is clear  Eyes:      General: No scleral icterus  Conjunctiva/sclera: Conjunctivae normal    Cardiovascular:      Rate and Rhythm: Normal rate and regular rhythm  Heart sounds: Normal heart sounds  No murmur heard  Pulmonary:      Effort: Pulmonary effort is normal  No respiratory distress  Breath sounds: Normal breath sounds  Abdominal:      General: Bowel sounds are normal  There is no distension  Palpations: Abdomen is soft  Tenderness: There is no abdominal tenderness  Musculoskeletal:         General: No deformity  Normal range of motion  Cervical back: Normal range of motion and neck supple  Right lower leg: No edema  Left lower leg: No edema  Lymphadenopathy:      Cervical: No cervical adenopathy  Skin:     General: Skin is warm and dry  Coloration: Skin is not pale  Findings: No erythema or rash  Neurological:      General: No focal deficit present  Mental Status: She is alert and oriented to person, place, and time  Cranial Nerves: No cranial nerve deficit  Motor: No weakness     Psychiatric:         Mood and Affect: Mood normal          Behavior: Behavior normal          Vital Signs  ED Triage Vitals [08/06/22 0017]   Temperature Pulse Respirations Blood Pressure SpO2   97 7 °F (36 5 °C) (!) 114 20 121/71 94 %      Temp Source Heart Rate Source Patient Position - Orthostatic VS BP Location FiO2 (%)   Tympanic Monitor Sitting Left arm --      Pain Score       --           Vitals:    08/06/22 0017 08/06/22 0023   BP: 121/71    Pulse: (!) 114 102   Patient Position - Orthostatic VS: Sitting          Visual Acuity      ED Medications  Medications   butalbital-acetaminophen-caffeine (FIORICET,ESGIC) -40 mg per tablet 2 tablet (has no administration in time range)       Diagnostic Studies  Results Reviewed     Procedure Component Value Units Date/Time Manny only [[id number]] Collected: 08/06/22 0024    Lab Status: In process Specimen: Nares from Nose Updated: 08/06/22 0026    UA (URINE) with reflex to Scope [[id number]]     Lab Status: No result Specimen: Urine                  No orders to display              Procedures  Procedures         ED Course                                             MDM  Number of Diagnoses or Management Options  Diagnosis management comments: Will screen for covid and check UA  Will call pt  With results  Advised rest, fluids, continue tylenol/advil, symptomatic tx  Disposition  Final diagnoses:   Fever   Viral illness     Time reflects when diagnosis was documented in both MDM as applicable and the Disposition within this note     Time User Action Codes Description Comment    8/8/2058 93:33 AM Henry JENSEN Add [H32 6] Fever     2/2/2612 45:05 AM Dinorah Morales Add [E46 5] Viral illness       ED Disposition     ED Disposition   Discharge    Condition   Stable    Date/Time   Sat Aug 6, 2022 12:24 AM    Comment   Julia Lott discharge to home/self care  Follow-up Information     Follow up With Specialties Details Why Contact Info    Dale Nathan MD Family Medicine  As needed [address] [place] [address]  [phone]            Patient's Medications   Discharge Prescriptions    No medications on file       No discharge procedures on file      PDMP Review     None          ED Provider  Electronically Signed by           Tamara Moralez MD  03/76/56 [id number]

## 2022-08-06 NOTE — DISCHARGE INSTRUCTIONS
Rest, fluids, isolate  Continue tylenol and/or advil every 6 hours as needed  We will call you tomorrow with covid results

## 2022-08-09 ENCOUNTER — TELEPHONE (OUTPATIENT)
Dept: FAMILY MEDICINE CLINIC | Facility: CLINIC | Age: 37
End: 2022-08-09

## 2022-08-09 NOTE — TELEPHONE ENCOUNTER
[facility]- per Dr Luis Falcon need to schedule virtual appointment tomorrow - covid positive

## 2022-08-16 ENCOUNTER — TELEMEDICINE (OUTPATIENT)
Dept: FAMILY MEDICINE CLINIC | Facility: CLINIC | Age: 37
End: 2022-08-16
Payer: COMMERCIAL

## 2022-08-16 DIAGNOSIS — U07.1 COVID-19: Primary | ICD-10-CM

## 2022-08-16 PROCEDURE — 99213 OFFICE O/P EST LOW 20 MIN: CPT | Performed by: FAMILY MEDICINE

## 2022-08-16 RX ORDER — BENZONATATE 200 MG/1
200 CAPSULE ORAL 3 TIMES DAILY PRN
Qty: 20 CAPSULE | Refills: 0 | Status: SHIPPED | OUTPATIENT
Start: 2022-08-16

## 2022-08-16 RX ORDER — MOMETASONE FUROATE AND FORMOTEROL FUMARATE DIHYDRATE 200; 5 UG/1; UG/1
2 AEROSOL RESPIRATORY (INHALATION) 2 TIMES DAILY
Qty: 13 G | Refills: 0 | Status: SHIPPED | OUTPATIENT
Start: 2022-08-16

## 2022-08-16 NOTE — LETTER
August 16, 2022     Patient: Hillary Sevilla  YOB: 1985  Date of Visit: 8/16/2022      To Whom it May Concern:    Hillary Sevilla is under my professional care  lFoyd Cookiechristie was seen virtually on 8/16/2022  Folyd Levy may return to work on 8/22/2022  Pt was out of work from 8/6/2022    If you have any questions or concerns, please don't hesitate to call           Sincerely,          Eddie Cole MD        CC: No Recipients

## 2022-08-16 NOTE — PROGRESS NOTES
COVID-19 Outpatient Progress Note    Assessment/Plan:    Problem List Items Addressed This Visit    None     Visit Diagnoses     COVID-19    -  Primary    Relevant Medications    benzonatate (TESSALON) 200 MG capsule    mometasone-formoterol (Dulera) 200-5 MCG/ACT inhaler         Disposition:     I recommended self-quarantine for 10 days and to watch for symptoms until 14 days after exposure  If patient were to develop symptoms, they should self isolate and call our office for further guidance  I have spent 8 minutes directly with the patient  Greater than 50% of this time was spent in counseling/coordination of care regarding: prognosis  Encounter provider Ever Cheung MD    Provider located at [address] [place] [id number]    Recent Visits  Date Type Provider Dept   08/09/22 Telephone [facility] recent visits within past 7 days and meeting all other requirements  Today's Visits  Date Type Provider Dept   08/16/22 Telemedicine Ever Cheung  [facility] today's visits and meeting all other requirements  Future Appointments  No visits were found meeting these conditions  Showing future appointments within next 150 days and meeting all other requirements       The patient was identified by name and date of birth  Fredi Guillen was informed that this is a telemedicine visit and that the visit is being conducted through [address] [place] [address] and patient was informed that this is a secure, HIPAA-compliant platform  She agrees to proceed     My office door was closed  No one else was in the room  She acknowledged consent and understanding of privacy and security of the video platform  The patient has agreed to participate and understands they can discontinue the visit at any time  Patient is aware this is a billable service      This virtual check-in was done via [employer] and patient was informed that this is a secure, HIPAA-compliant platform  She agrees to proceed  Patient agrees to participate in a virtual check in via telephone or video visit instead of presenting to the office to address urgent/immediate medical needs  Patient is aware this is a billable service  After connecting through [facility], the patient was identified by name and date of birth  Trixie Ch was informed that this was a telemedicine visit and that the exam was being conducted confidentially over secure lines  My office door was closed  No one else was in the room  Trixie Ch acknowledged consent and understanding of privacy and security of the telemedicine visit  I informed the patient that I have reviewed her record in Epic and presented the opportunity for her to ask any questions regarding the visit today  The patient agreed to participate  Verification of patient location:  Patient is located in the following state in which I hold an active license: [place]    Subjective:   Trixie Ch is a 40 y o  female who is concerned about COVID-19  Patient's symptoms include fatigue, malaise, nasal congestion and cough  Patient denies fever, chills, rhinorrhea, sore throat, anosmia, loss of taste, shortness of breath, chest tightness, abdominal pain, nausea, vomiting, diarrhea, myalgias and headaches       - Date of symptom onset: 8/6/2022      COVID-19 vaccination status: Not vaccinated    Exposure:   Contact with a person who is under investigation (PUI) for or who is positive for COVID-19 within the last 14 days?: No    Hospitalized recently for fever and/or lower respiratory symptoms?: No      Currently a healthcare worker that is involved in direct patient care?: No      Works in a special setting where the risk of COVID-19 transmission may be high? (this may include long-term care, correctional and long-term facilities; homeless shelters; assisted-living facilities and group homes ): No      Resident in a special setting where the risk of COVID-19 transmission may be high? (this may include long-term care, correctional and shelter facilities; homeless shelters; assisted-living facilities and group homes ): No      Lab Results   Component Value Date    SARSCOV2 Positive (A) 2022     Past Medical History:   Diagnosis Date    Acid reflux     Allergic rhinitis     Anxiety     Depression     GERD (gastroesophageal reflux disease)     Hypertension     after her pregnancy  no longer on BP meds  (19)    Obesity     Pre-diabetes     Urinary tract infection      Past Surgical History:   Procedure Laterality Date    ANKLE SURGERY      APPENDECTOMY           SECTION      x 2    COMBINED HYSTEROSCOPY DIAGNOSTIC / D&C      ECTOPIC PREGNANCY SURGERY      GASTRECTOMY SLEEVE LAPAROSCOPIC      TONSILLECTOMY       Current Outpatient Medications   Medication Sig Dispense Refill    buPROPion (WELLBUTRIN) 75 mg tablet Take 1 tablet (75 mg total) by mouth 2 (two) times a day 30 tablet 5    clonazePAM (KlonoPIN) 0 5 mg tablet Take 1 tablet (0 5 mg total) by mouth 2 (two) times a day 30 tablet 5    escitalopram (Lexapro) 10 mg tablet Take 1 tablet (10 mg total) by mouth daily 30 tablet 3    levonorgestrel (MIRENA) 20 MCG/24HR IUD 1 each by Intrauterine route      loratadine (CLARITIN) 10 mg tablet Take 10 mg by mouth daily as needed for allergies      Multiple Vitamins-Minerals (MULTIVITAMIN ADULT, MINERALS, PO) Take 1 tablet by mouth daily       No current facility-administered medications for this visit  No Known Allergies    Review of Systems   Constitutional: Positive for fatigue  Negative for chills and fever  HENT: Positive for congestion  Negative for rhinorrhea and sore throat  Respiratory: Positive for cough  Negative for chest tightness and shortness of breath      Gastrointestinal: Negative for abdominal pain, diarrhea, nausea and vomiting  Musculoskeletal: Negative for myalgias  Neurological: Negative for headaches  Objective: There were no vitals filed for this visit  Physical Exam  Constitutional:       Appearance: She is obese  She is not ill-appearing  Pulmonary:      Effort: Pulmonary effort is normal  No respiratory distress  Neurological:      General: No focal deficit present  Mental Status: She is alert and oriented to person, place, and time     Psychiatric:         Mood and Affect: Mood normal

## 2022-08-31 DIAGNOSIS — F32.A ANXIETY AND DEPRESSION: ICD-10-CM

## 2022-08-31 DIAGNOSIS — F41.9 ANXIETY AND DEPRESSION: ICD-10-CM

## 2022-08-31 RX ORDER — ESCITALOPRAM OXALATE 10 MG/1
TABLET ORAL
Qty: 90 TABLET | Refills: 1 | Status: SHIPPED | OUTPATIENT
Start: 2022-08-31

## 2022-12-01 ENCOUNTER — CONSULT (OUTPATIENT)
Dept: GASTROENTEROLOGY | Facility: CLINIC | Age: 37
End: 2022-12-01

## 2022-12-01 VITALS
HEIGHT: 70 IN | HEART RATE: 71 BPM | SYSTOLIC BLOOD PRESSURE: 102 MMHG | DIASTOLIC BLOOD PRESSURE: 70 MMHG | BODY MASS INDEX: 41.8 KG/M2 | WEIGHT: 292 LBS

## 2022-12-01 DIAGNOSIS — R10.84 GENERALIZED ABDOMINAL PAIN: ICD-10-CM

## 2022-12-01 DIAGNOSIS — R12 HEARTBURN: ICD-10-CM

## 2022-12-01 DIAGNOSIS — K21.9 GASTROESOPHAGEAL REFLUX DISEASE WITHOUT ESOPHAGITIS: ICD-10-CM

## 2022-12-01 DIAGNOSIS — R19.7 DIARRHEA, UNSPECIFIED TYPE: Primary | ICD-10-CM

## 2022-12-01 DIAGNOSIS — Z90.3 H/O GASTRIC SLEEVE: ICD-10-CM

## 2022-12-01 RX ORDER — PANTOPRAZOLE SODIUM 40 MG/1
40 TABLET, DELAYED RELEASE ORAL DAILY
Qty: 30 TABLET | Refills: 5 | Status: SHIPPED | OUTPATIENT
Start: 2022-12-01 | End: 2022-12-31

## 2022-12-01 NOTE — PATIENT INSTRUCTIONS
Follow anti-reflux diet and measures      Scheduled date of EGD/colonoscopy (as of today): 12/20/22  Physician performing EGD/colonoscopy: DR Deion Delaney  Location of EGD/colonoscopy: [place] [id number]  Desired bowel prep reviewed with patient: Golytely  Instructions reviewed with patient by:   Clearances:  N/A

## 2022-12-01 NOTE — PROGRESS NOTES
[facility] - Outpatient Consultation  Isac Riggs 40 y o  female MRN: [id number]  Encounter: [id number]          ASSESSMENT AND PLAN:      1  Diarrhea, unspecified type  Patient reports diarrhea associated with generalized abdominal pain which has been ongoing for the last 2 weeks but is not occurring on a daily basis  Patient will have 2-3 loose to liquid bowel movements  Patient denies any bright red blood from rectal area, bright red blood in stool, or black tarry stool  Diarrhea associated with generalized abdominal pain may be secondary to underlying infection, irritable bowel syndrome, inflammatory bowel disease, or microscopic colitis  - Stool Enteric Bacterial Panel by PCR; Future  - Clostridium difficile toxin by PCR with EIA; Future  - Giardia lamblia, EIA and Ova and Parasites Examination; Future  - Stool Enteric Bacterial Panel by PCR  - Clostridium difficile toxin by PCR with EIA  - Giardia lamblia, EIA and Ova and Parasites Examination  - polyethylene glycol (GOLYTELY) 4000 mL solution; Take 4,000 mL by mouth once for 1 dose Take as directed by office prior to procedure  Dispense: 4000 mL; Refill: 0  - Colonoscopy; schedule colonoscopy for further evaluation of diarrhea  Prep and procedure explained to patient in detail  Further recommendations pending results of colonoscopy  2  Gastroesophageal reflux disease without esophagitis  3  Heartburn  4  Generalized abdominal pain  5  Hx gastric sleeve  Patient reports acid reflux, heartburn, and generalized abdominal pain which has been ongoing for the past 2 weeks and occurs on a daily basis  Patient has been taking Tums over-the-counter with minimal relief  Patient is currently on no PPI or acid reduction medication  Patient does have a history of gastric sleeve which was done 02/12/2020   - pantoprazole (PROTONIX) 40 mg tablet;  Take 1 tablet (40 mg total) by mouth daily Take 1/2 hour prior to breakfast Dispense: 30 tablet; Refill: 5  - EGD; schedule for EGD for further evaluation of acid reflux and heartburn  Prep and procedure explained to patient in detail  Further recommendations pending results of EGD  -Follow anti-reflux diet and measures      Follow-up 4 weeks after procedures  ______________________________________________________________________    HPI: Terence Moreno is a 60-year-old female who presents to the office with multiple GI issues  Patient reports diarrhea associated with generalized abdominal pain which has been ongoing for the last 2 weeks but is not occurring on a daily basis  Patient will have 2-3 loose to liquid bowel movements  Patient denies any bright red blood from rectal area, bright red blood in stool, or black tarry stool  Patient reports acid reflux, heartburn, and generalized abdominal pain which has been ongoing for the past 2 weeks and occurs on a daily basis  Patient has been taking Tums over-the-counter with minimal relief  Patient is currently on no PPI or acid reduction medication  Patient does have a history of gastric sleeve which was done 02/12/2020  Patient reported that after her surgery she was on pantoprazole which did control her acid reflux and heartburn  Abdominal exam positive for generalized tenderness throughout  Patient does not smoke  Patient does not drink alcohol  Patient does not take NSAIDs  Patient reports she takes acetaminophen  Patient does not use any illicit drugs or marijuana  No family history of gastric or colon cancer  REVIEW OF SYSTEMS:    CONSTITUTIONAL: Denies any fever, chills, rigors, and weight loss  HEENT: No earache or tinnitus  Denies hearing loss or visual disturbances  CARDIOVASCULAR: No chest pain or palpitations  RESPIRATORY: Denies any cough, hemoptysis, shortness of breath or dyspnea on exertion  GASTROINTESTINAL: As noted in the History of Present Illness  GENITOURINARY: No problems with urination  Denies any hematuria or dysuria  NEUROLOGIC: No dizziness or vertigo, denies headaches  MUSCULOSKELETAL: Denies any muscle or joint pain  SKIN: Denies skin rashes or itching  ENDOCRINE: Denies excessive thirst  Denies intolerance to heat or cold  PSYCHOSOCIAL: Denies depression or anxiety  Denies any recent memory loss  Historical Information   Past Medical History:   Diagnosis Date   • Acid reflux    • Allergic rhinitis    • Anxiety    • Depression    • GERD (gastroesophageal reflux disease)    • Hypertension     after her pregnancy  no longer on BP meds  (19)   • Obesity    • Pre-diabetes    • Urinary tract infection      Past Surgical History:   Procedure Laterality Date   • ANKLE SURGERY     • APPENDECTOMY         •  SECTION      x 2   • COMBINED HYSTEROSCOPY DIAGNOSTIC / D&C     • ECTOPIC PREGNANCY SURGERY     • GASTRECTOMY SLEEVE LAPAROSCOPIC     • TONSILLECTOMY       Social History   Social History     Substance and Sexual Activity   Alcohol Use Not Currently     Social History     Substance and Sexual Activity   Drug Use Never     Social History     Tobacco Use   Smoking Status Never   Smokeless Tobacco Never     Family History   Problem Relation Age of Onset   • Heart failure Mother    • Pulmonary embolism Father        Meds/Allergies       Current Outpatient Medications:   •  levonorgestrel (MIRENA) 20 MCG/24HR IUD  •  loratadine (CLARITIN) 10 mg tablet  •  Multiple Vitamins-Minerals (MULTIVITAMIN ADULT, MINERALS, PO)  •  pantoprazole (PROTONIX) 40 mg tablet  •  polyethylene glycol (GOLYTELY) 4000 mL solution    No Known Allergies        Objective     Blood pressure 102/70, pulse 71, height 5' 10" (1 778 m), weight 132 kg (292 lb), not currently breastfeeding  Body mass index is 41 9 kg/m²          PHYSICAL EXAM:      General Appearance:   Alert, cooperative, no distress   HEENT:   Normocephalic, atraumatic, anicteric      Neck:  Supple, symmetrical, trachea midline Lungs:   Clear to auscultation bilaterally; no rales, rhonchi or wheezing; respirations unlabored    Heart[de-identified]   Regular rate and rhythm; no murmur, rub, or gallop  Abdomen:   Soft, positive mild  tenderness throughout abdomen with palpation  , non-distended; normal bowel sounds; no masses, no organomegaly    Genitalia:   Deferred    Rectal:   Deferred    Extremities:  No cyanosis, clubbing or edema    Pulses:  2+ and symmetric    Skin:  No jaundice, rashes, or lesions    Lymph nodes:  No palpable cervical lymphadenopathy        Lab Results:   No visits with results within 1 Day(s) from this visit  Latest known visit with results is:   Admission on 08/06/2022, Discharged on 08/06/2022   Component Date Value   • SARS-CoV-2 08/06/2022 Positive (A)    • Color, UA 08/06/2022 Light Yellow    • Clarity, UA 08/06/2022 Clear    • Specific Gravity, UA 08/06/2022 1 015    • pH, UA 08/06/2022 6 5    • Leukocytes, UA 08/06/2022 Negative    • Nitrite, UA 08/06/2022 Negative    • Protein, UA 08/06/2022 Negative    • Glucose, UA 08/06/2022 Negative    • Ketones, UA 08/06/2022 Negative    • Urobilinogen, UA 08/06/2022 0 2    • Bilirubin, UA 08/06/2022 Negative    • Occult Blood, UA 08/06/2022 Negative          Radiology Results:   No results found

## 2022-12-13 ENCOUNTER — TELEPHONE (OUTPATIENT)
Dept: GASTROENTEROLOGY | Facility: CLINIC | Age: 37
End: 2022-12-13

## 2022-12-13 NOTE — TELEPHONE ENCOUNTER
Spoke to pt  confirming pt's colonoscopy/egd scheduled on 12/20/22 at [facility] with Dr Leslie Rolle   Informed [facility] would be calling the day prior with the arrival time  Pt has instructions and did not have any questions

## 2022-12-16 ENCOUNTER — TELEPHONE (OUTPATIENT)
Dept: GASTROENTEROLOGY | Facility: CLINIC | Age: 37
End: 2022-12-16

## 2022-12-19 ENCOUNTER — OFFICE VISIT (OUTPATIENT)
Dept: URGENT CARE | Facility: CLINIC | Age: 37
End: 2022-12-19

## 2022-12-19 VITALS
SYSTOLIC BLOOD PRESSURE: 137 MMHG | TEMPERATURE: 97.1 F | HEART RATE: 93 BPM | WEIGHT: 291 LBS | BODY MASS INDEX: 41.75 KG/M2 | OXYGEN SATURATION: 97 % | RESPIRATION RATE: 18 BRPM | DIASTOLIC BLOOD PRESSURE: 66 MMHG

## 2022-12-19 DIAGNOSIS — R60.0 BILATERAL LEG EDEMA: Primary | ICD-10-CM

## 2022-12-19 NOTE — PROGRESS NOTES
[facility]        NAME: Dennis Wolf is a 40 y o  female  : 1985    MRN: [id number]  DATE: 2022  TIME: 3:01 PM    Assessment and Plan   Bilateral leg edema [R60 0]  1  Bilateral leg edema          Patient Instructions   Bilateral leg edema  No signs of DVT, vitals normal  Increase water intake, watch sodium intake  Elevate legs above heart  F/u with PCP for further work up    Follow up with PCP in 3-5 days  Proceed to  ER if symptoms worsen  Chief Complaint     Chief Complaint   Patient presents with   • Edema     B/L leg swelling x yesterday          History of Present Illness       Sukhi Beckwith is a 79-year-old female who presents to clinic complaining of bilateral leg swelling x3 days  She states that on Saturday she noticed that her bilateral upper thighs were felt tight and the next day noticed that she had some swelling  She denies any pain  She states that the swelling is not improved in the morning when she wakes up  She denies any new medications or products  She has been only drinking protein shakes for the last 3 weeks due to dieting she denies any headache, chest pain, or shortness of breath  Review of Systems   Review of Systems   Constitutional: Negative  Respiratory: Negative for shortness of breath  Cardiovascular: Negative for chest pain  Musculoskeletal:        See HPI   Neurological: Negative for headaches           Current Medications       Current Outpatient Medications:   •  levonorgestrel (MIRENA) 20 MCG/24HR IUD, 1 each by Intrauterine route, Disp: , Rfl:   •  Multiple Vitamins-Minerals (MULTIVITAMIN ADULT, MINERALS, PO), Take 1 tablet by mouth daily, Disp: , Rfl:   •  pantoprazole (PROTONIX) 40 mg tablet, Take 1 tablet (40 mg total) by mouth daily Take 1/2 hour prior to breakfast, Disp: 30 tablet, Rfl: 5  •  polyethylene glycol (GOLYTELY) 4000 mL solution, Take 4,000 mL by mouth once for 1 dose Take as directed by office prior to procedure, Disp: 4000 mL, Rfl: 0    Current Allergies     Allergies as of 2022   • (No Known Allergies)            The following portions of the patient's history were reviewed and updated as appropriate: allergies, current medications, past family history, past medical history, past social history, past surgical history and problem list      Past Medical History:   Diagnosis Date   • Acid reflux    • Allergic rhinitis    • Anxiety    • Depression    • GERD (gastroesophageal reflux disease)    • Hypertension     after her pregnancy  no longer on BP meds  (19)   • Obesity    • Pre-diabetes    • Urinary tract infection        Past Surgical History:   Procedure Laterality Date   • ANKLE SURGERY     • APPENDECTOMY         •  SECTION      x 2   • COMBINED HYSTEROSCOPY DIAGNOSTIC / D&C     • ECTOPIC PREGNANCY SURGERY     • GASTRECTOMY SLEEVE LAPAROSCOPIC     • TONSILLECTOMY         Family History   Problem Relation Age of Onset   • Heart failure Mother    • Pulmonary embolism Father          Medications have been verified  Objective   /66   Pulse 93   Temp (!) 97 1 °F (36 2 °C)   Resp 18   Wt 132 kg (291 lb)   SpO2 97%   BMI 41 75 kg/m²   No LMP recorded  Patient has had an implant  Physical Exam     Physical Exam  Vitals and nursing note reviewed  Constitutional:       General: She is not in acute distress  Appearance: Normal appearance  She is not ill-appearing  Cardiovascular:      Rate and Rhythm: Normal rate and regular rhythm  Heart sounds: Normal heart sounds  Pulmonary:      Effort: Pulmonary effort is normal       Breath sounds: Normal breath sounds  Musculoskeletal:      Right upper leg: Swelling present  No edema or tenderness  Left upper leg: Swelling present  No edema or tenderness  Right knee: Normal  No swelling or effusion  Normal range of motion  No tenderness  Left knee: Normal  No swelling or effusion   Normal range of motion  No tenderness  Right lower leg: Swelling present  No tenderness  No edema  Left lower leg: Swelling present  No tenderness  No edema  Comments: Negative Homans    Minimal swelling visualized, pt notes difference   Neurological:      Mental Status: She is alert and oriented to person, place, and time     Psychiatric:         Mood and Affect: Mood normal          Behavior: Behavior normal

## 2022-12-30 ENCOUNTER — OFFICE VISIT (OUTPATIENT)
Dept: PSYCHIATRY | Facility: CLINIC | Age: 37
End: 2022-12-30

## 2022-12-30 ENCOUNTER — TELEPHONE (OUTPATIENT)
Dept: PSYCHIATRY | Facility: CLINIC | Age: 37
End: 2022-12-30

## 2022-12-30 DIAGNOSIS — F41.1 GENERALIZED ANXIETY DISORDER: Primary | ICD-10-CM

## 2022-12-30 NOTE — PSYCH
This note was not shared with the patient due to reasonable likelihood of causing patient harm    PROGRESS NOTE        [address] [place] [address]      Name and Date of Birth:  Dennis Wolf 40 y o  1985    Date of Visit: 12/30/22    SUBJECTIVE:    Sukhi Beckwith was seen today for follow-up and medication management  She was casually dressed, alert and oriented to reacts and maintained good eye contact  Her speech was clear and of normal rate, rhythm and volume  Thought processes were clear and goal directed  Today she presented with continued feelings of anxiety and some depression, associated with a very demanding job  At this time she is working 7 days a week from 5 PM to 1 AM in addition of taking care of her daughter, and has been waiting for her pension to be invested before she makes a decision  However she has reached a point where she will be quitting her job in February and has made arrangements with a management company that will take care of her practice  She denies suicidal ideation, intent or plan at present, has no suicidal ideation, intent or plan at present  She denies any auditory hallucinations and visual hallucinations, denies any other delusional thinking, denies any delusional thinking  She denies any side effects from medications    HPI ROS Appetite Changes and Sleep: normal appetite, normal sleep    Review Of Systems:      Constitutional As noted in HPI   ENT As noted in HPI   Cardiovascular As noted in HPI   Respiratory As noted in HPI   Gastrointestinal As noted in HPI   Genitourinary As noted in HPI   Musculoskeletal As noted in HPI   Integumentary As noted in HPI   Neurological As noted in HPI   Endocrine As noted in HPI   Other Symptoms Negative and None       Laboratory Results: No results found for this or any previous visit      Substance Abuse History:    Social History     Substance and Sexual Activity   Drug Use Never Family Psychiatric History:     Family History   Problem Relation Age of Onset   • Heart failure Mother    • Pulmonary embolism Father        The following portions of the patient's history were reviewed and updated as appropriate: past family history, past medical history, past social history, past surgical history and problem list     Social History     Socioeconomic History   • Marital status:      Spouse name: Not on file   • Number of children: Not on file   • Years of education: Not on file   • Highest education level: Not on file   Occupational History   • Not on file   Tobacco Use   • Smoking status: Never   • Smokeless tobacco: Never   Vaping Use   • Vaping Use: Never used   Substance and Sexual Activity   • Alcohol use: Not Currently   • Drug use: Never   • Sexual activity: Yes     Partners: Male     Birth control/protection: Implant     Comment: Mirena 2/2018   Other Topics Concern   • Not on file   Social History Narrative   • Not on file     Social Determinants of Health     Financial Resource Strain: Not on file   Food Insecurity: Not on file   Transportation Needs: Not on file   Physical Activity: Not on file   Stress: Not on file   Social Connections: Not on file   Intimate Partner Violence: Not on file   Housing Stability: Not on file     Social History     Social History Narrative   • Not on file        Social History     Tobacco History     Smoking Status  Never    Smokeless Tobacco Use  Never          Alcohol History     Alcohol Use Status  Not Currently          Drug Use     Drug Use Status  Never          Sexual Activity     Sexually Active  Yes Partners  Male Birth Control/Protection  Implant Comment  Mirena 2/2018          Activities of Daily Living    Not Asked                     OBJECTIVE:     Mental Status Evaluation:    Appearance age appropriate, casually dressed   Behavior pleasant, cooperative   Speech normal volume, normal pitch   Mood  appropriate   Affect  full and appropriate   Thought Processes logical   Associations intact associations   Thought Content normal   Perceptual Disturbances: none   Abnormal Thoughts  Risk Potential Suicidal ideation - None  Homicidal ideation - None  Potential for aggression - No   Orientation oriented to person, place, time/date and situation   Memory recent and remote memory grossly intact   Cosciousness alert and awake   Attention Span attention span and concentration are age appropriate   Intellect Appears to be of Average Intelligence   Insight age appropriate    Judgement good    Muscle Strength and  Gait muscle strength and tone were normal   Language Intact   Fund of Knowledge Intact   Pain none   Pain Scale 0       Assessment/Plan:       Diagnoses and all orders for this visit:    Generalized anxiety disorder          Treatment Recommendations/Precautions:    Continue current medications:    Risks/Benefits      Risks, Benefits And Possible Side Effects Of Medications:    Risks, benefits, and possible side effects of medications explained to patient and patient verbalizes understanding and agreement for treatment  Controlled Medication Discussion:         Psychotherapy Provided:     Individual psychotherapy provided:  Today I spent 30 minutes counseling with Tomasa May

## 2022-12-30 NOTE — BH TREATMENT PLAN
TREATMENT PLAN (Medication Management Only)        [facility]    Name and Date of Birth:  Dima Smith 40 y o  1985  Date of Treatment Plan: December 30, 2022  Diagnosis/Diagnoses:    1  Generalized anxiety disorder      Strengths/Personal Resources for Self-Care: supportive family, supportive friends, ability to communicate well, ability to listen, ability to reason, average or above intelligence, general fund of knowledge, motivation for treatment  Area/Areas of need (in own words): anxiety symptoms  1  Long Term Goal: decrease anxiety  Target Date:6 months - 6/30/2023  Person/Persons responsible for completion of goal: Trixie Mondragon  Short Term Objective (s) - How will we reach this goal?:   A  Provider new recommended medication/dosage changes and/or continue medication(s): continue all other medications  B  N/A   C  N/A  Target Date:6 months - 6/30/2023  Person/Persons Responsible for Completion of Goal: Lucian Marcus  Progress Towards Goals: continuing treatment  Treatment Modality: medication management every 6 months  Review due 180 days from date of this plan: 6 months - 6/30/2023  Expected length of service: ongoing treatment  My Physician/PA/NP and I have developed this plan together and I agree to work on the goals and objectives  I understand the treatment goals that were developed for my treatment

## 2023-01-04 ENCOUNTER — TELEPHONE (OUTPATIENT)
Dept: GASTROENTEROLOGY | Facility: CLINIC | Age: 38
End: 2023-01-04

## 2023-01-04 NOTE — TELEPHONE ENCOUNTER
Spoke to pt  confirming pt's colonoscopy scheduled on 1/11/23 at [facility] with Dr Domi Zambrano   Informed [facility] would be calling the day prior with the arrival time  Pt has instructions

## 2023-01-05 DIAGNOSIS — F41.1 GAD (GENERALIZED ANXIETY DISORDER): Primary | ICD-10-CM

## 2023-01-05 RX ORDER — BUPROPION HYDROCHLORIDE 75 MG/1
75 TABLET ORAL DAILY
Qty: 30 TABLET | Refills: 3 | Status: SHIPPED | OUTPATIENT
Start: 2023-01-05

## 2023-01-05 RX ORDER — BUPROPION HYDROCHLORIDE 75 MG/1
75 TABLET ORAL ONCE
COMMUNITY
End: 2023-01-05 | Stop reason: SDUPTHER

## 2023-01-05 RX ORDER — CLONAZEPAM 0.5 MG/1
0.5 TABLET ORAL 2 TIMES DAILY
COMMUNITY
End: 2023-01-05 | Stop reason: SDUPTHER

## 2023-01-05 RX ORDER — CLONAZEPAM 0.5 MG/1
0.5 TABLET ORAL 2 TIMES DAILY
Qty: 60 TABLET | Refills: 0 | Status: SHIPPED | OUTPATIENT
Start: 2023-01-05